# Patient Record
Sex: MALE | Race: WHITE | NOT HISPANIC OR LATINO
[De-identification: names, ages, dates, MRNs, and addresses within clinical notes are randomized per-mention and may not be internally consistent; named-entity substitution may affect disease eponyms.]

---

## 2017-01-02 ENCOUNTER — RESULT REVIEW (OUTPATIENT)
Age: 72
End: 2017-01-02

## 2017-01-03 ENCOUNTER — OUTPATIENT (OUTPATIENT)
Dept: OUTPATIENT SERVICES | Facility: HOSPITAL | Age: 72
LOS: 1 days | End: 2017-01-03
Payer: MEDICARE

## 2017-01-03 LAB
HCT VFR BLD CALC: 37.5 % — LOW (ref 39–50)
HGB BLD-MCNC: 12.3 G/DL — LOW (ref 13–17)
MCHC RBC-ENTMCNC: 28.1 PG — SIGNIFICANT CHANGE UP (ref 27–34)
MCHC RBC-ENTMCNC: 32.8 G/DL — SIGNIFICANT CHANGE UP (ref 32–36)
MCV RBC AUTO: 85.6 FL — SIGNIFICANT CHANGE UP (ref 80–100)
PLATELET # BLD AUTO: 348 K/UL — SIGNIFICANT CHANGE UP (ref 150–400)
RBC # BLD: 4.38 M/UL — SIGNIFICANT CHANGE UP (ref 4.2–5.8)
RBC # FLD: 14.6 % — SIGNIFICANT CHANGE UP (ref 10.3–16.9)
WBC # BLD: 11.1 K/UL — HIGH (ref 3.8–10.5)
WBC # FLD AUTO: 11.1 K/UL — HIGH (ref 3.8–10.5)

## 2017-01-03 PROCEDURE — 88173 CYTOPATH EVAL FNA REPORT: CPT

## 2017-01-03 PROCEDURE — 88341 IMHCHEM/IMCYTCHM EA ADD ANTB: CPT

## 2017-01-03 PROCEDURE — 88305 TISSUE EXAM BY PATHOLOGIST: CPT

## 2017-01-03 PROCEDURE — 76942 ECHO GUIDE FOR BIOPSY: CPT | Mod: 26

## 2017-01-03 PROCEDURE — 88307 TISSUE EXAM BY PATHOLOGIST: CPT

## 2017-01-03 PROCEDURE — 47000 NEEDLE BIOPSY OF LIVER PERQ: CPT

## 2017-01-03 PROCEDURE — 85027 COMPLETE CBC AUTOMATED: CPT

## 2017-01-03 PROCEDURE — 88342 IMHCHEM/IMCYTCHM 1ST ANTB: CPT

## 2017-01-05 LAB
NON-GYNECOLOGICAL CYTOLOGY STUDY: SIGNIFICANT CHANGE UP
SURGICAL PATHOLOGY STUDY: SIGNIFICANT CHANGE UP

## 2017-01-10 ENCOUNTER — OUTPATIENT (OUTPATIENT)
Dept: OUTPATIENT SERVICES | Facility: HOSPITAL | Age: 72
LOS: 1 days | End: 2017-01-10
Payer: MEDICARE

## 2017-01-10 PROCEDURE — 36561 INSERT TUNNELED CV CATH: CPT

## 2017-01-10 PROCEDURE — 77001 FLUOROGUIDE FOR VEIN DEVICE: CPT

## 2017-01-10 PROCEDURE — C1769: CPT

## 2017-01-10 PROCEDURE — C1788: CPT

## 2017-01-10 PROCEDURE — 76937 US GUIDE VASCULAR ACCESS: CPT

## 2017-02-04 ENCOUNTER — OUTPATIENT (OUTPATIENT)
Dept: OUTPATIENT SERVICES | Facility: HOSPITAL | Age: 72
LOS: 1 days | End: 2017-02-04
Payer: MEDICARE

## 2017-02-04 PROCEDURE — 74178 CT ABD&PLV WO CNTR FLWD CNTR: CPT

## 2017-02-04 PROCEDURE — 74178 CT ABD&PLV WO CNTR FLWD CNTR: CPT | Mod: 26

## 2017-02-14 ENCOUNTER — OUTPATIENT (OUTPATIENT)
Dept: OUTPATIENT SERVICES | Facility: HOSPITAL | Age: 72
LOS: 1 days | End: 2017-02-14
Payer: MEDICARE

## 2017-02-14 LAB
ALBUMIN SERPL ELPH-MCNC: 2.9 G/DL — LOW (ref 3.4–5)
ALP SERPL-CCNC: 91 U/L — SIGNIFICANT CHANGE UP (ref 40–120)
ALT FLD-CCNC: 27 U/L — SIGNIFICANT CHANGE UP (ref 12–42)
ANION GAP SERPL CALC-SCNC: 9 MMOL/L — SIGNIFICANT CHANGE UP (ref 9–16)
AST SERPL-CCNC: 24 U/L — SIGNIFICANT CHANGE UP (ref 15–37)
BILIRUB SERPL-MCNC: 0.6 MG/DL — SIGNIFICANT CHANGE UP (ref 0.2–1.2)
BUN SERPL-MCNC: 21 MG/DL — SIGNIFICANT CHANGE UP (ref 7–23)
CALCIUM SERPL-MCNC: 9.5 MG/DL — SIGNIFICANT CHANGE UP (ref 8.5–10.5)
CHLORIDE SERPL-SCNC: 104 MMOL/L — SIGNIFICANT CHANGE UP (ref 96–108)
CO2 SERPL-SCNC: 25 MMOL/L — SIGNIFICANT CHANGE UP (ref 22–31)
CREAT SERPL-MCNC: 0.71 MG/DL — SIGNIFICANT CHANGE UP (ref 0.5–1.3)
GLUCOSE SERPL-MCNC: 101 MG/DL — HIGH (ref 70–99)
POTASSIUM SERPL-MCNC: 4.1 MMOL/L — SIGNIFICANT CHANGE UP (ref 3.5–5.3)
POTASSIUM SERPL-SCNC: 4.1 MMOL/L — SIGNIFICANT CHANGE UP (ref 3.5–5.3)
PROT SERPL-MCNC: 7.3 G/DL — SIGNIFICANT CHANGE UP (ref 6.4–8.2)
SODIUM SERPL-SCNC: 138 MMOL/L — SIGNIFICANT CHANGE UP (ref 135–145)

## 2017-02-14 PROCEDURE — 36248 INS CATH ABD/L-EXT ART ADDL: CPT

## 2017-02-14 PROCEDURE — 75726 ARTERY X-RAYS ABDOMEN: CPT

## 2017-02-14 PROCEDURE — C1887: CPT

## 2017-02-14 PROCEDURE — 36247 INS CATH ABD/L-EXT ART 3RD: CPT

## 2017-02-14 PROCEDURE — 77263 THER RADIOLOGY TX PLNG CPLX: CPT

## 2017-02-14 PROCEDURE — 36246 INS CATH ABD/L-EXT ART 2ND: CPT | Mod: XS

## 2017-02-14 PROCEDURE — 75774 ARTERY X-RAY EACH VESSEL: CPT | Mod: 26,59

## 2017-02-14 PROCEDURE — 76377 3D RENDER W/INTRP POSTPROCES: CPT | Mod: 26

## 2017-02-14 PROCEDURE — C1769: CPT

## 2017-02-14 PROCEDURE — C1894: CPT

## 2017-02-14 PROCEDURE — 75774 ARTERY X-RAY EACH VESSEL: CPT | Mod: 59

## 2017-02-14 PROCEDURE — 78205: CPT | Mod: 26

## 2017-02-14 PROCEDURE — 77300 RADIATION THERAPY DOSE PLAN: CPT

## 2017-02-14 PROCEDURE — 77300 RADIATION THERAPY DOSE PLAN: CPT | Mod: 26

## 2017-02-14 PROCEDURE — 75726 ARTERY X-RAYS ABDOMEN: CPT | Mod: 26,59

## 2017-02-14 PROCEDURE — A9540: CPT

## 2017-02-14 PROCEDURE — 80053 COMPREHEN METABOLIC PANEL: CPT

## 2017-02-14 PROCEDURE — 78803 RP LOCLZJ TUM SPECT 1 AREA: CPT

## 2017-02-23 ENCOUNTER — OUTPATIENT (OUTPATIENT)
Dept: OUTPATIENT SERVICES | Facility: HOSPITAL | Age: 72
LOS: 1 days | End: 2017-02-23
Payer: MEDICARE

## 2017-02-23 PROCEDURE — 78598 LUNG PERF&VENTILAT DIFERENTL: CPT

## 2017-02-23 PROCEDURE — C1894: CPT

## 2017-02-23 PROCEDURE — 36246 INS CATH ABD/L-EXT ART 2ND: CPT

## 2017-02-23 PROCEDURE — 78803 RP LOCLZJ TUM SPECT 1 AREA: CPT

## 2017-02-23 PROCEDURE — C1887: CPT

## 2017-02-23 PROCEDURE — 78205: CPT | Mod: 26

## 2017-02-23 PROCEDURE — 37243 VASC EMBOLIZE/OCCLUDE ORGAN: CPT

## 2017-02-23 PROCEDURE — C1769: CPT

## 2017-02-23 PROCEDURE — 79445 NUCLEAR RX INTRA-ARTERIAL: CPT | Mod: 26

## 2017-02-23 PROCEDURE — C2616: CPT

## 2017-03-16 ENCOUNTER — OUTPATIENT (OUTPATIENT)
Dept: OUTPATIENT SERVICES | Facility: HOSPITAL | Age: 72
LOS: 1 days | End: 2017-03-16
Payer: MEDICARE

## 2017-03-16 PROCEDURE — 36247 INS CATH ABD/L-EXT ART 3RD: CPT

## 2017-03-16 PROCEDURE — C1769: CPT

## 2017-03-16 PROCEDURE — C1894: CPT

## 2017-03-16 PROCEDURE — 37243 VASC EMBOLIZE/OCCLUDE ORGAN: CPT

## 2017-03-16 PROCEDURE — C1887: CPT

## 2017-03-16 PROCEDURE — 79445 NUCLEAR RX INTRA-ARTERIAL: CPT | Mod: 26

## 2017-03-16 PROCEDURE — 78803 RP LOCLZJ TUM SPECT 1 AREA: CPT

## 2017-03-16 PROCEDURE — 79445 NUCLEAR RX INTRA-ARTERIAL: CPT

## 2017-03-16 PROCEDURE — 36248 INS CATH ABD/L-EXT ART ADDL: CPT

## 2017-03-16 PROCEDURE — C2616: CPT

## 2017-03-16 PROCEDURE — 78205: CPT | Mod: 26

## 2017-03-16 PROCEDURE — 36246 INS CATH ABD/L-EXT ART 2ND: CPT

## 2017-04-25 ENCOUNTER — OUTPATIENT (OUTPATIENT)
Dept: OUTPATIENT SERVICES | Facility: HOSPITAL | Age: 72
LOS: 1 days | End: 2017-04-25
Payer: MEDICARE

## 2017-04-25 PROCEDURE — 74178 CT ABD&PLV WO CNTR FLWD CNTR: CPT

## 2017-04-25 PROCEDURE — 74178 CT ABD&PLV WO CNTR FLWD CNTR: CPT | Mod: 26

## 2017-05-15 ENCOUNTER — INPATIENT (INPATIENT)
Facility: HOSPITAL | Age: 72
LOS: 3 days | Discharge: HOME | DRG: 871 | End: 2017-05-19
Attending: INTERNAL MEDICINE | Admitting: INTERNAL MEDICINE
Payer: MEDICARE

## 2017-05-15 VITALS
HEART RATE: 102 BPM | SYSTOLIC BLOOD PRESSURE: 123 MMHG | DIASTOLIC BLOOD PRESSURE: 76 MMHG | OXYGEN SATURATION: 96 % | WEIGHT: 229.94 LBS | TEMPERATURE: 103 F | RESPIRATION RATE: 18 BRPM

## 2017-05-15 LAB
ALBUMIN SERPL ELPH-MCNC: 2.4 G/DL — LOW (ref 3.4–5)
ALP SERPL-CCNC: 77 U/L — SIGNIFICANT CHANGE UP (ref 40–120)
ALT FLD-CCNC: 20 U/L — SIGNIFICANT CHANGE UP (ref 12–42)
ANION GAP SERPL CALC-SCNC: 7 MMOL/L — LOW (ref 9–16)
APPEARANCE UR: CLEAR — SIGNIFICANT CHANGE UP
AST SERPL-CCNC: 27 U/L — SIGNIFICANT CHANGE UP (ref 15–37)
BASOPHILS NFR BLD AUTO: 0.9 % — SIGNIFICANT CHANGE UP (ref 0–2)
BILIRUB SERPL-MCNC: 0.9 MG/DL — SIGNIFICANT CHANGE UP (ref 0.2–1.2)
BILIRUB UR-MCNC: NEGATIVE — SIGNIFICANT CHANGE UP
BUN SERPL-MCNC: 10 MG/DL — SIGNIFICANT CHANGE UP (ref 7–23)
CALCIUM SERPL-MCNC: 8.5 MG/DL — SIGNIFICANT CHANGE UP (ref 8.5–10.5)
CHLORIDE SERPL-SCNC: 102 MMOL/L — SIGNIFICANT CHANGE UP (ref 96–108)
CO2 SERPL-SCNC: 28 MMOL/L — SIGNIFICANT CHANGE UP (ref 22–31)
COLOR SPEC: YELLOW — SIGNIFICANT CHANGE UP
CREAT SERPL-MCNC: 0.79 MG/DL — SIGNIFICANT CHANGE UP (ref 0.5–1.3)
DIFF PNL FLD: (no result)
EOSINOPHIL NFR BLD AUTO: 0.3 % — SIGNIFICANT CHANGE UP (ref 0–6)
GLUCOSE SERPL-MCNC: 125 MG/DL — HIGH (ref 70–99)
GLUCOSE UR QL: NEGATIVE — SIGNIFICANT CHANGE UP
HCT VFR BLD CALC: 33.6 % — LOW (ref 39–50)
HGB BLD-MCNC: 10.9 G/DL — LOW (ref 13–17)
KETONES UR-MCNC: NEGATIVE — SIGNIFICANT CHANGE UP
LACTATE SERPL-SCNC: 2.4 MMOL/L — HIGH (ref 0.5–2)
LEUKOCYTE ESTERASE UR-ACNC: NEGATIVE — SIGNIFICANT CHANGE UP
LYMPHOCYTES # BLD AUTO: 25.2 % — SIGNIFICANT CHANGE UP (ref 13–44)
MCHC RBC-ENTMCNC: 27.9 PG — SIGNIFICANT CHANGE UP (ref 27–34)
MCHC RBC-ENTMCNC: 32.4 G/DL — SIGNIFICANT CHANGE UP (ref 32–36)
MCV RBC AUTO: 86.2 FL — SIGNIFICANT CHANGE UP (ref 80–100)
MONOCYTES NFR BLD AUTO: 38.8 % — HIGH (ref 2–14)
NEUTROPHILS NFR BLD AUTO: 34.8 % — LOW (ref 43–77)
NITRITE UR-MCNC: NEGATIVE — SIGNIFICANT CHANGE UP
PH UR: 6.5 — SIGNIFICANT CHANGE UP (ref 5–8)
PLATELET # BLD AUTO: 176 K/UL — SIGNIFICANT CHANGE UP (ref 150–400)
POTASSIUM SERPL-MCNC: 3.8 MMOL/L — SIGNIFICANT CHANGE UP (ref 3.5–5.3)
POTASSIUM SERPL-SCNC: 3.8 MMOL/L — SIGNIFICANT CHANGE UP (ref 3.5–5.3)
PROT SERPL-MCNC: 7.4 G/DL — SIGNIFICANT CHANGE UP (ref 6.4–8.2)
PROT UR-MCNC: NEGATIVE MG/DL — SIGNIFICANT CHANGE UP
RAPID RVP RESULT: DETECTED
RBC # BLD: 3.9 M/UL — LOW (ref 4.2–5.8)
RBC # FLD: 16 % — SIGNIFICANT CHANGE UP (ref 10.3–16.9)
RV+EV RNA SPEC QL NAA+PROBE: DETECTED
SODIUM SERPL-SCNC: 137 MMOL/L — SIGNIFICANT CHANGE UP (ref 135–145)
SP GR SPEC: 1.01 — SIGNIFICANT CHANGE UP (ref 1–1.03)
UROBILINOGEN FLD QL: 0.2 E.U./DL — SIGNIFICANT CHANGE UP
WBC # BLD: 3.2 K/UL — LOW (ref 3.8–10.5)
WBC # FLD AUTO: 3.2 K/UL — LOW (ref 3.8–10.5)

## 2017-05-15 PROCEDURE — 93010 ELECTROCARDIOGRAM REPORT: CPT

## 2017-05-15 PROCEDURE — 99285 EMERGENCY DEPT VISIT HI MDM: CPT | Mod: 25

## 2017-05-15 PROCEDURE — 71010: CPT | Mod: 26

## 2017-05-15 RX ORDER — ACETAMINOPHEN 500 MG
650 TABLET ORAL ONCE
Qty: 0 | Refills: 0 | Status: COMPLETED | OUTPATIENT
Start: 2017-05-15 | End: 2017-05-15

## 2017-05-15 RX ORDER — ACETAMINOPHEN 500 MG
650 TABLET ORAL EVERY 6 HOURS
Qty: 0 | Refills: 0 | Status: DISCONTINUED | OUTPATIENT
Start: 2017-05-15 | End: 2017-05-19

## 2017-05-15 RX ORDER — SODIUM CHLORIDE 9 MG/ML
500 INJECTION INTRAMUSCULAR; INTRAVENOUS; SUBCUTANEOUS
Qty: 0 | Refills: 0 | Status: DISCONTINUED | OUTPATIENT
Start: 2017-05-15 | End: 2017-05-16

## 2017-05-15 RX ORDER — SENNA PLUS 8.6 MG/1
2 TABLET ORAL AT BEDTIME
Qty: 0 | Refills: 0 | Status: DISCONTINUED | OUTPATIENT
Start: 2017-05-15 | End: 2017-05-19

## 2017-05-15 RX ORDER — SODIUM CHLORIDE 9 MG/ML
500 INJECTION INTRAMUSCULAR; INTRAVENOUS; SUBCUTANEOUS ONCE
Qty: 0 | Refills: 0 | Status: COMPLETED | OUTPATIENT
Start: 2017-05-15 | End: 2017-05-15

## 2017-05-15 RX ORDER — HEPARIN SODIUM 5000 [USP'U]/ML
5000 INJECTION INTRAVENOUS; SUBCUTANEOUS EVERY 8 HOURS
Qty: 0 | Refills: 0 | Status: DISCONTINUED | OUTPATIENT
Start: 2017-05-15 | End: 2017-05-19

## 2017-05-15 RX ORDER — VANCOMYCIN HCL 1 G
1000 VIAL (EA) INTRAVENOUS ONCE
Qty: 0 | Refills: 0 | Status: COMPLETED | OUTPATIENT
Start: 2017-05-15 | End: 2017-05-15

## 2017-05-15 RX ORDER — SODIUM CHLORIDE 9 MG/ML
1000 INJECTION INTRAMUSCULAR; INTRAVENOUS; SUBCUTANEOUS
Qty: 0 | Refills: 0 | Status: DISCONTINUED | OUTPATIENT
Start: 2017-05-15 | End: 2017-05-17

## 2017-05-15 RX ORDER — VANCOMYCIN HCL 1 G
1000 VIAL (EA) INTRAVENOUS EVERY 12 HOURS
Qty: 0 | Refills: 0 | Status: DISCONTINUED | OUTPATIENT
Start: 2017-05-16 | End: 2017-05-16

## 2017-05-15 RX ORDER — PIPERACILLIN AND TAZOBACTAM 4; .5 G/20ML; G/20ML
3.38 INJECTION, POWDER, LYOPHILIZED, FOR SOLUTION INTRAVENOUS EVERY 6 HOURS
Qty: 0 | Refills: 0 | Status: COMPLETED | OUTPATIENT
Start: 2017-05-15 | End: 2017-05-16

## 2017-05-15 RX ORDER — FILGRASTIM 480MCG/1.6
480 VIAL (ML) INJECTION ONCE
Qty: 0 | Refills: 0 | Status: COMPLETED | OUTPATIENT
Start: 2017-05-15 | End: 2017-05-15

## 2017-05-15 RX ORDER — AMLODIPINE BESYLATE 2.5 MG/1
10 TABLET ORAL DAILY
Qty: 0 | Refills: 0 | Status: DISCONTINUED | OUTPATIENT
Start: 2017-05-16 | End: 2017-05-17

## 2017-05-15 RX ORDER — SODIUM CHLORIDE 9 MG/ML
3 INJECTION INTRAMUSCULAR; INTRAVENOUS; SUBCUTANEOUS ONCE
Qty: 0 | Refills: 0 | Status: COMPLETED | OUTPATIENT
Start: 2017-05-15 | End: 2017-05-15

## 2017-05-15 RX ORDER — DOCUSATE SODIUM 100 MG
100 CAPSULE ORAL THREE TIMES A DAY
Qty: 0 | Refills: 0 | Status: DISCONTINUED | OUTPATIENT
Start: 2017-05-15 | End: 2017-05-19

## 2017-05-15 RX ORDER — PIPERACILLIN AND TAZOBACTAM 4; .5 G/20ML; G/20ML
3.38 INJECTION, POWDER, LYOPHILIZED, FOR SOLUTION INTRAVENOUS ONCE
Qty: 0 | Refills: 0 | Status: COMPLETED | OUTPATIENT
Start: 2017-05-15 | End: 2017-05-15

## 2017-05-15 RX ADMIN — SODIUM CHLORIDE 2000 MILLILITER(S): 9 INJECTION INTRAMUSCULAR; INTRAVENOUS; SUBCUTANEOUS at 19:57

## 2017-05-15 RX ADMIN — SODIUM CHLORIDE 2000 MILLILITER(S): 9 INJECTION INTRAMUSCULAR; INTRAVENOUS; SUBCUTANEOUS at 19:58

## 2017-05-15 RX ADMIN — SODIUM CHLORIDE 125 MILLILITER(S): 9 INJECTION INTRAMUSCULAR; INTRAVENOUS; SUBCUTANEOUS at 21:28

## 2017-05-15 RX ADMIN — PIPERACILLIN AND TAZOBACTAM 200 GRAM(S): 4; .5 INJECTION, POWDER, LYOPHILIZED, FOR SOLUTION INTRAVENOUS at 19:47

## 2017-05-15 RX ADMIN — Medication 480 MICROGRAM(S): at 21:15

## 2017-05-15 RX ADMIN — SENNA PLUS 2 TABLET(S): 8.6 TABLET ORAL at 23:50

## 2017-05-15 RX ADMIN — Medication 100 MILLIGRAM(S): at 23:58

## 2017-05-15 RX ADMIN — SODIUM CHLORIDE 3 MILLILITER(S): 9 INJECTION INTRAMUSCULAR; INTRAVENOUS; SUBCUTANEOUS at 19:11

## 2017-05-15 RX ADMIN — SODIUM CHLORIDE 1500 MILLILITER(S): 9 INJECTION INTRAMUSCULAR; INTRAVENOUS; SUBCUTANEOUS at 21:27

## 2017-05-15 RX ADMIN — Medication 250 MILLIGRAM(S): at 21:16

## 2017-05-15 RX ADMIN — Medication 650 MILLIGRAM(S): at 21:15

## 2017-05-15 NOTE — ED PROVIDER NOTE - OBJECTIVE STATEMENT
Pt with PMHx HTN, colon CA w/ mets to the liver on chemo p/w fever x 3-4 days. Pt reports cough w/ clear-white sputum. + mild rhinorrhea. No CP, SOB, abdominal pain, n/v/d, dysuria, or rash. Pt was started on Levaquin, of which today he took the 4th dose, w/o relief. No recent travel or sick contacts. No recent hospitalizations

## 2017-05-15 NOTE — ED ADULT NURSE REASSESSMENT NOTE - NS ED NURSE REASSESS COMMENT FT1
Right port catherter was accessed  with sterile equipment, manager Sommer HUBER at bedside. PT. resting and dressing is CDI

## 2017-05-15 NOTE — ED ADULT NURSE NOTE - OBJECTIVE STATEMENT
Pt received aox3, complaint of fever for 3 days and feeling weak. Pt is being treated with chemotherapy and internal radiation for stage 4 liver cancer. Pt denies CP, states he has cough for 3 days with clear sputum. Pt has chest port for chemotherapy. Wife at bedside. Awaiting provider.

## 2017-05-15 NOTE — ED PROVIDER NOTE - DIAGNOSTIC INTERPRETATION
ER Physician: Palmira Mercado, DO  CHEST XRAY INTERPRETATION: lungs clear, heart shadow normal, bony structures intact

## 2017-05-15 NOTE — ED CLERICAL - NS ED CLERK NOTE PRE-ARRIVAL INFORMATION; ADDITIONAL PRE-ARRIVAL INFORMATION
eileen stack 72 Male colon cancer mets to liver on chemo fevers chills on levaquin x 3 days sob ? pneuomonia ? port infection?

## 2017-05-15 NOTE — ED PROVIDER NOTE - MEDICAL DECISION MAKING DETAILS
Pt p/w fever for several days. Mild cough. Clear lungs. No clear source. Pt high-risk for SBI 2/2 chemo /neutropenia. Will start abx, cx, d/w Dr Steward. Anticipate admission.

## 2017-05-15 NOTE — ED ADULT TRIAGE NOTE - CHIEF COMPLAINT QUOTE
patient BIBA on treatment for stage IV liver cancer. patient complains of cough and fever for the past 4 days. on Levoquin PO with no improvement. denies chest pain, sob. states that his doctor sent him in for further eval.

## 2017-05-15 NOTE — ED ADULT NURSE REASSESSMENT NOTE - NS ED NURSE REASSESS COMMENT FT1
pt has no complaints at this time. resting in stretcher. vs stable. ghada jung given report. awaiting transportation

## 2017-05-15 NOTE — ED PROVIDER NOTE - PROGRESS NOTE DETAILS
D/w Dr Steward - admit to her partner, Dr Hightower. Broad spectum abx initiated given on chemo, mild neutropenia. Requesting Neupogen 480 mcg x 1 now. She will have ID Dr Ramirez see the pt

## 2017-05-16 DIAGNOSIS — D64.9 ANEMIA, UNSPECIFIED: ICD-10-CM

## 2017-05-16 DIAGNOSIS — K59.00 CONSTIPATION, UNSPECIFIED: ICD-10-CM

## 2017-05-16 DIAGNOSIS — A41.9 SEPSIS, UNSPECIFIED ORGANISM: ICD-10-CM

## 2017-05-16 DIAGNOSIS — C18.9 MALIGNANT NEOPLASM OF COLON, UNSPECIFIED: ICD-10-CM

## 2017-05-16 DIAGNOSIS — I10 ESSENTIAL (PRIMARY) HYPERTENSION: ICD-10-CM

## 2017-05-16 LAB
ANION GAP SERPL CALC-SCNC: 9 MMOL/L — SIGNIFICANT CHANGE UP (ref 9–16)
APTT BLD: 33.4 SEC — SIGNIFICANT CHANGE UP (ref 27.5–37.4)
BASOPHILS NFR BLD AUTO: 0.5 % — SIGNIFICANT CHANGE UP (ref 0–2)
BASOPHILS NFR BLD AUTO: 2 % — SIGNIFICANT CHANGE UP (ref 0–2)
BLD GP AB SCN SERPL QL: NEGATIVE — SIGNIFICANT CHANGE UP
BUN SERPL-MCNC: 6 MG/DL — LOW (ref 7–23)
CALCIUM SERPL-MCNC: 8.8 MG/DL — SIGNIFICANT CHANGE UP (ref 8.5–10.5)
CHLORIDE SERPL-SCNC: 106 MMOL/L — SIGNIFICANT CHANGE UP (ref 96–108)
CO2 SERPL-SCNC: 24 MMOL/L — SIGNIFICANT CHANGE UP (ref 22–31)
CREAT SERPL-MCNC: 0.6 MG/DL — SIGNIFICANT CHANGE UP (ref 0.5–1.3)
EOSINOPHIL NFR BLD AUTO: 0 % — SIGNIFICANT CHANGE UP (ref 0–6)
FERRITIN SERPL-MCNC: 933.4 NG/ML — HIGH (ref 26–388)
FOLATE SERPL-MCNC: >20 NG/ML — SIGNIFICANT CHANGE UP (ref 4.8–24.2)
GLUCOSE SERPL-MCNC: 136 MG/DL — HIGH (ref 70–99)
HCT VFR BLD CALC: 32.8 % — LOW (ref 39–50)
HGB BLD-MCNC: 10.7 G/DL — LOW (ref 13–17)
INR BLD: 1.38 — HIGH (ref 0.88–1.16)
IRON SATN MFR SERPL: 12 UG/DL — LOW (ref 65–175)
IRON SATN MFR SERPL: 5 % — LOW (ref 26–39)
LACTATE SERPL-SCNC: 2.4 MMOL/L — HIGH (ref 0.5–2)
LACTATE SERPL-SCNC: 2.7 MMOL/L — HIGH (ref 0.5–2)
LACTATE SERPL-SCNC: 2.8 MMOL/L — HIGH (ref 0.5–2)
LACTATE SERPL-SCNC: 3.2 MMOL/L — HIGH (ref 0.5–2)
LACTATE SERPL-SCNC: 3.5 MMOL/L — HIGH (ref 0.5–2)
LG PLATELETS BLD QL AUTO: PRESENT — SIGNIFICANT CHANGE UP
LYMPHOCYTES # BLD AUTO: 12 % — LOW (ref 13–44)
LYMPHOCYTES # BLD AUTO: 15.6 % — SIGNIFICANT CHANGE UP (ref 13–44)
MAGNESIUM SERPL-MCNC: 1.5 MG/DL — LOW (ref 1.6–2.6)
MANUAL SMEAR VERIFICATION: SIGNIFICANT CHANGE UP
MCHC RBC-ENTMCNC: 27.9 PG — SIGNIFICANT CHANGE UP (ref 27–34)
MCHC RBC-ENTMCNC: 32.6 G/DL — SIGNIFICANT CHANGE UP (ref 32–36)
MCV RBC AUTO: 85.4 FL — SIGNIFICANT CHANGE UP (ref 80–100)
MONOCYTES NFR BLD AUTO: 26 % — HIGH (ref 2–14)
MONOCYTES NFR BLD AUTO: 32.3 % — HIGH (ref 2–14)
NEUTROPHILS NFR BLD AUTO: 47 % — SIGNIFICANT CHANGE UP (ref 43–77)
NEUTROPHILS NFR BLD AUTO: 51.6 % — SIGNIFICANT CHANGE UP (ref 43–77)
NEUTS BAND # BLD: 10 % — SIGNIFICANT CHANGE UP
PHOSPHATE SERPL-MCNC: 2.1 MG/DL — LOW (ref 2.5–4.5)
PLAT MORPH BLD: (no result)
PLATELET # BLD AUTO: 146 K/UL — LOW (ref 150–400)
POLYCHROMASIA BLD QL SMEAR: SLIGHT — SIGNIFICANT CHANGE UP
POTASSIUM SERPL-MCNC: 3.4 MMOL/L — LOW (ref 3.5–5.3)
POTASSIUM SERPL-SCNC: 3.4 MMOL/L — LOW (ref 3.5–5.3)
PROTHROM AB SERPL-ACNC: 15.4 SEC — HIGH (ref 9.8–12.7)
RBC # BLD: 3.84 M/UL — LOW (ref 4.2–5.8)
RBC # FLD: 16.5 % — SIGNIFICANT CHANGE UP (ref 10.3–16.9)
RBC BLD AUTO: (no result)
RH IG SCN BLD-IMP: POSITIVE — SIGNIFICANT CHANGE UP
SODIUM SERPL-SCNC: 139 MMOL/L — SIGNIFICANT CHANGE UP (ref 135–145)
TIBC SERPL-MCNC: 257 UG/DL — SIGNIFICANT CHANGE UP (ref 250–450)
TSH SERPL-MCNC: 0.73 UIU/ML — SIGNIFICANT CHANGE UP (ref 0.35–4.94)
VARIANT LYMPHS # BLD: 3 % — SIGNIFICANT CHANGE UP
VIT B12 SERPL-MCNC: 793 PG/ML — SIGNIFICANT CHANGE UP (ref 243–894)
WBC # BLD: 4 K/UL — SIGNIFICANT CHANGE UP (ref 3.8–10.5)
WBC # FLD AUTO: 4 K/UL — SIGNIFICANT CHANGE UP (ref 3.8–10.5)

## 2017-05-16 PROCEDURE — 93010 ELECTROCARDIOGRAM REPORT: CPT

## 2017-05-16 PROCEDURE — 71010: CPT | Mod: 26

## 2017-05-16 RX ORDER — PIPERACILLIN AND TAZOBACTAM 4; .5 G/20ML; G/20ML
3.38 INJECTION, POWDER, LYOPHILIZED, FOR SOLUTION INTRAVENOUS EVERY 6 HOURS
Qty: 0 | Refills: 0 | Status: DISCONTINUED | OUTPATIENT
Start: 2017-05-16 | End: 2017-05-16

## 2017-05-16 RX ORDER — SODIUM CHLORIDE 9 MG/ML
1000 INJECTION INTRAMUSCULAR; INTRAVENOUS; SUBCUTANEOUS ONCE
Qty: 0 | Refills: 0 | Status: COMPLETED | OUTPATIENT
Start: 2017-05-16 | End: 2017-05-16

## 2017-05-16 RX ORDER — MAGNESIUM SULFATE 500 MG/ML
4 VIAL (ML) INJECTION ONCE
Qty: 0 | Refills: 0 | Status: COMPLETED | OUTPATIENT
Start: 2017-05-16 | End: 2017-05-16

## 2017-05-16 RX ORDER — POTASSIUM CHLORIDE 20 MEQ
40 PACKET (EA) ORAL ONCE
Qty: 0 | Refills: 0 | Status: COMPLETED | OUTPATIENT
Start: 2017-05-16 | End: 2017-05-16

## 2017-05-16 RX ORDER — SODIUM CHLORIDE 9 MG/ML
500 INJECTION INTRAMUSCULAR; INTRAVENOUS; SUBCUTANEOUS ONCE
Qty: 0 | Refills: 0 | Status: COMPLETED | OUTPATIENT
Start: 2017-05-16 | End: 2017-05-16

## 2017-05-16 RX ORDER — PIPERACILLIN AND TAZOBACTAM 4; .5 G/20ML; G/20ML
4.5 INJECTION, POWDER, LYOPHILIZED, FOR SOLUTION INTRAVENOUS EVERY 6 HOURS
Qty: 0 | Refills: 0 | Status: DISCONTINUED | OUTPATIENT
Start: 2017-05-16 | End: 2017-05-19

## 2017-05-16 RX ORDER — VANCOMYCIN HCL 1 G
1500 VIAL (EA) INTRAVENOUS EVERY 12 HOURS
Qty: 0 | Refills: 0 | Status: DISCONTINUED | OUTPATIENT
Start: 2017-05-17 | End: 2017-05-17

## 2017-05-16 RX ORDER — POTASSIUM PHOSPHATE, MONOBASIC POTASSIUM PHOSPHATE, DIBASIC 236; 224 MG/ML; MG/ML
15 INJECTION, SOLUTION INTRAVENOUS ONCE
Qty: 0 | Refills: 0 | Status: COMPLETED | OUTPATIENT
Start: 2017-05-16 | End: 2017-05-16

## 2017-05-16 RX ORDER — VANCOMYCIN HCL 1 G
VIAL (EA) INTRAVENOUS
Qty: 0 | Refills: 0 | Status: DISCONTINUED | OUTPATIENT
Start: 2017-05-16 | End: 2017-05-17

## 2017-05-16 RX ORDER — VANCOMYCIN HCL 1 G
1500 VIAL (EA) INTRAVENOUS ONCE
Qty: 0 | Refills: 0 | Status: COMPLETED | OUTPATIENT
Start: 2017-05-16 | End: 2017-05-16

## 2017-05-16 RX ORDER — VANCOMYCIN HCL 1 G
1500 VIAL (EA) INTRAVENOUS ONCE
Qty: 0 | Refills: 0 | Status: DISCONTINUED | OUTPATIENT
Start: 2017-05-16 | End: 2017-05-16

## 2017-05-16 RX ORDER — SODIUM CHLORIDE 9 MG/ML
1000 INJECTION INTRAMUSCULAR; INTRAVENOUS; SUBCUTANEOUS
Qty: 0 | Refills: 0 | Status: COMPLETED | OUTPATIENT
Start: 2017-05-16 | End: 2017-05-16

## 2017-05-16 RX ORDER — VANCOMYCIN HCL 1 G
1500 VIAL (EA) INTRAVENOUS EVERY 12 HOURS
Qty: 0 | Refills: 0 | Status: DISCONTINUED | OUTPATIENT
Start: 2017-05-16 | End: 2017-05-16

## 2017-05-16 RX ADMIN — Medication 100 MILLIGRAM(S): at 22:05

## 2017-05-16 RX ADMIN — PIPERACILLIN AND TAZOBACTAM 200 GRAM(S): 4; .5 INJECTION, POWDER, LYOPHILIZED, FOR SOLUTION INTRAVENOUS at 07:38

## 2017-05-16 RX ADMIN — SODIUM CHLORIDE 1000 MILLILITER(S): 9 INJECTION INTRAMUSCULAR; INTRAVENOUS; SUBCUTANEOUS at 12:33

## 2017-05-16 RX ADMIN — Medication 40 MILLIEQUIVALENT(S): at 12:33

## 2017-05-16 RX ADMIN — PIPERACILLIN AND TAZOBACTAM 200 GRAM(S): 4; .5 INJECTION, POWDER, LYOPHILIZED, FOR SOLUTION INTRAVENOUS at 15:10

## 2017-05-16 RX ADMIN — Medication 300 MILLIGRAM(S): at 22:02

## 2017-05-16 RX ADMIN — SODIUM CHLORIDE 4000 MILLILITER(S): 9 INJECTION INTRAMUSCULAR; INTRAVENOUS; SUBCUTANEOUS at 01:21

## 2017-05-16 RX ADMIN — PIPERACILLIN AND TAZOBACTAM 200 GRAM(S): 4; .5 INJECTION, POWDER, LYOPHILIZED, FOR SOLUTION INTRAVENOUS at 20:06

## 2017-05-16 RX ADMIN — Medication 100 GRAM(S): at 12:32

## 2017-05-16 RX ADMIN — Medication 100 MILLIGRAM(S): at 06:20

## 2017-05-16 RX ADMIN — HEPARIN SODIUM 5000 UNIT(S): 5000 INJECTION INTRAVENOUS; SUBCUTANEOUS at 13:06

## 2017-05-16 RX ADMIN — HEPARIN SODIUM 5000 UNIT(S): 5000 INJECTION INTRAVENOUS; SUBCUTANEOUS at 06:19

## 2017-05-16 RX ADMIN — SODIUM CHLORIDE 1000 MILLILITER(S): 9 INJECTION INTRAMUSCULAR; INTRAVENOUS; SUBCUTANEOUS at 20:05

## 2017-05-16 RX ADMIN — HEPARIN SODIUM 5000 UNIT(S): 5000 INJECTION INTRAVENOUS; SUBCUTANEOUS at 22:05

## 2017-05-16 RX ADMIN — Medication 300 MILLIGRAM(S): at 09:48

## 2017-05-16 RX ADMIN — PIPERACILLIN AND TAZOBACTAM 200 GRAM(S): 4; .5 INJECTION, POWDER, LYOPHILIZED, FOR SOLUTION INTRAVENOUS at 02:03

## 2017-05-16 RX ADMIN — SODIUM CHLORIDE 3000 MILLILITER(S): 9 INJECTION INTRAMUSCULAR; INTRAVENOUS; SUBCUTANEOUS at 03:01

## 2017-05-16 RX ADMIN — SODIUM CHLORIDE 1000 MILLILITER(S): 9 INJECTION INTRAMUSCULAR; INTRAVENOUS; SUBCUTANEOUS at 15:18

## 2017-05-16 RX ADMIN — POTASSIUM PHOSPHATE, MONOBASIC POTASSIUM PHOSPHATE, DIBASIC 62.5 MILLIMOLE(S): 236; 224 INJECTION, SOLUTION INTRAVENOUS at 17:50

## 2017-05-16 RX ADMIN — Medication 100 MILLIGRAM(S): at 13:06

## 2017-05-16 RX ADMIN — AMLODIPINE BESYLATE 10 MILLIGRAM(S): 2.5 TABLET ORAL at 06:20

## 2017-05-16 NOTE — H&P ADULT - NSHPLABSRESULTS_GEN_ALL_CORE
.  LABS:                         10.9   3.2   )-----------( 176      ( 15 May 2017 18:27 )             33.6     05-15    137  |  102  |  10  ----------------------------<  125<H>  3.8   |  28  |  0.79    Ca    8.5      15 May 2017 18:27    TPro  7.4  /  Alb  2.4<L>  /  TBili  0.9  /  DBili  x   /  AST  27  /  ALT  20  /  AlkPhos  77  05-15      Urinalysis Basic - ( 15 May 2017 21:39 )    Color: Yellow / Appearance: Clear / S.010 / pH: x  Gluc: x / Ketone: NEGATIVE  / Bili: NEGATIVE / Urobili: 0.2 E.U./dL   Blood: x / Protein: NEGATIVE mg/dL / Nitrite: NEGATIVE   Leuk Esterase: NEGATIVE / RBC: < 5 /HPF / WBC < 5 /HPF   Sq Epi: x / Non Sq Epi: Rare /HPF / Bacteria: Present /HPF            Lactate, Blood: 2.4 mmoL/L (05-15 @ 18:24)      RADIOLOGY, EKG & ADDITIONAL TESTS: Reviewed.     CXR: no overt focal opacification .  LABS:                         10.9   3.2   )-----------( 176      ( 15 May 2017 18:27 )             33.6     05-15    137  |  102  |  10  ----------------------------<  125<H>  3.8   |  28  |  0.79    Ca    8.5      15 May 2017 18:27    TPro  7.4  /  Alb  2.4<L>  /  TBili  0.9  /  DBili  x   /  AST  27  /  ALT  20  /  AlkPhos  77  05-15      Urinalysis Basic - ( 15 May 2017 21:39 )    Color: Yellow / Appearance: Clear / S.010 / pH: x  Gluc: x / Ketone: NEGATIVE  / Bili: NEGATIVE / Urobili: 0.2 E.U./dL   Blood: x / Protein: NEGATIVE mg/dL / Nitrite: NEGATIVE   Leuk Esterase: NEGATIVE / RBC: < 5 /HPF / WBC < 5 /HPF   Sq Epi: x / Non Sq Epi: Rare /HPF / Bacteria: Present /HPF            Lactate, Blood: 2.4 mmoL/L (05-15 @ 18:24)      RADIOLOGY, EKG & ADDITIONAL TESTS: Reviewed.     CXR: no overt focal opacification    ECG: NSR, no acute ischemic STT changes, poor R wave progression

## 2017-05-16 NOTE — H&P ADULT - ASSESSMENT
72 yo M PMH HTN, metastatic colon ca (Dx 12/2016, no surgery, radiation 2 months ago, chemo started 4 month sago and most recent 5/2/17, +mets to liver, has chemoport) presents with fever and cough x 3 days. WBC 3.2. no bands available. ANC even without bands 1114. Lactate 2.4.

## 2017-05-16 NOTE — H&P ADULT - PROBLEM SELECTOR PLAN 5
VÍCTOR. BP acceptable.  - c/w home norvasc 10 qd  - holding coreg 12.5 bid in setting of sepsis, re start as clinically appropriate

## 2017-05-16 NOTE — CONSULT NOTE ADULT - SUBJECTIVE AND OBJECTIVE BOX
HPI:  72 yo M PMH HTN, metastatic colon ca (Dx 2016, no surgery, radiation 2 months ago, chemo started 4 month sago and most recent 17, +mets to liver, has chemoport) presents with fever and cough x 3 days. Pt in usual state of health until 3 days ago when began experiencing malaise/fatigue/cough productive of green sputum/chills. Multiple family members have had similar Sx in recent days. No HA/vision changes/CP/SOB/abd pain/N/V/diarrhea/dysuria/urinary frequency/leg pain or swelling. Pt reports no bowel movement x days and mild bloating.    Now denies any respiratory symptoms  Took Levoflox for 3 days without improvement    Otherwise ROS negative    PAST MEDICAL & SURGICAL HISTORY:  Colon cancer  Liver cancer  Hypertension  No significant past surgical history      MEDICATIONS  (STANDING):  sodium chloride 0.9%. 1000milliLiter(s) IV Continuous <Continuous>  heparin  Injectable 5000Unit(s) SubCutaneous every 8 hours  docusate sodium 100milliGRAM(s) Oral three times a day  amLODIPine   Tablet 10milliGRAM(s) Oral daily  vancomycin  IVPB 1500milliGRAM(s) IV Intermittent every 12 hours  piperacillin/tazobactam IVPB. 3.375Gram(s) IV Intermittent every 6 hours  magnesium sulfate  IVPB 4Gram(s) IV Intermittent once  potassium chloride    Tablet ER 40milliEquivalent(s) Oral once  potassium phosphate IVPB 15milliMole(s) IV Intermittent once    MEDICATIONS  (PRN):  acetaminophen   Tablet 650milliGRAM(s) Oral every 6 hours PRN For Temp greater than 38 C (100.4 F)  acetaminophen   Tablet. 650milliGRAM(s) Oral every 6 hours PRN Mild Pain (1 - 3)  bisacodyl 5milliGRAM(s) Oral daily PRN Constipation  senna 2Tablet(s) Oral at bedtime PRN Constipation      Allergies    No Known Allergies      SOCIAL HISTORY:  Lives at home    FAMILY HISTORY:  Family history of pancreatic cancer (Mother)      Vital Signs Last 24 Hrs  T(C): 37.7, Max: 39.3 (15 @ 17:29)  T(F): 99.8, Max: 102.7 (0515 @ 17:29)  HR: 99 (86 - 102)  BP: 120/75 (109/70 - 133/82)  BP(mean): --  RR: 20 (17 - 20)  SpO2: 96% (95% - 99%)  On RA  Awake and alert  Nontoxic but ill appearing  No rash  No oral lesions  Port site without swelling, erythema or tenderness  RRR  Chest with rhonchi and decreased BSs at right base  Abd protuberant and distended but NT  LE min edema  No Craft        LABS:                        10.7   4.0   )-----------( 146      ( 16 May 2017 06:32 )             32.8         139  |  106  |  6<L>  ----------------------------<  136<H>  3.4<L>   |  24  |  0.60    Ca    8.8      16 May 2017 06:32  Phos  2.1       Mg     1.5         TPro  7.4  /  Alb  2.4<L>  /  TBili  0.9  /  DBili  x   /  AST  27  /  ALT  20  /  AlkPhos  77  0515    PT/INR - ( 16 May 2017 06:32 )   PT: 15.4 sec;   INR: 1.38          PTT - ( 16 May 2017 06:32 )  PTT:33.4 sec  Urinalysis Basic - ( 15 May 2017 21:39 )    Color: Yellow / Appearance: Clear / S.010 / pH: x  Gluc: x / Ketone: NEGATIVE  / Bili: NEGATIVE / Urobili: 0.2 E.U./dL   Blood: x / Protein: NEGATIVE mg/dL / Nitrite: NEGATIVE   Leuk Esterase: NEGATIVE / RBC: < 5 /HPF / WBC < 5 /HPF   Sq Epi: x / Non Sq Epi: Rare /HPF / Bacteria: Present /HPF    Culture - Blood (05.15.17 @ 19:50)    Specimen Source: .Blood Blood-Peripheral    Culture Results:   No growth at 12 hours    Culture - Blood (05.15.17 @ 19:50)    Specimen Source: .Blood Blood-Peripheral    Culture Results:   No growth at 12 hours      RADIOLOGY & ADDITIONAL STUDIES:    EXAM:  XR CHEST 1 VIEW PORT ROUTINE                          PROCEDURE DATE:  2017           INTERPRETATION:  Indication: Fever    A single portable view of the chest is submitted. Comparison is made to   the most recent prior studydated 5/15/2017. No significant interval   change is present. The tip of the left costophrenic angle is not   visualized.     Impression:    No significant interval change      Rapid Respiratory Viral Panel (05.15.17 @ 21:32)    Entero/Rhinovirus (RapRVP): Detected

## 2017-05-16 NOTE — CONSULT NOTE ADULT - ASSESSMENT
Colon CA, on chemo; hx of neutropenia  Patient immunocompromised  Fever  Infection with entero/rhinovirus but unsure whether this is the cause of fever  At risk for pneumonia    RECOMMEND  Continue IV Vanco and Pip-Tazo for now awaiting further cx incubation  In hospital observation  Check Legionella urinary Ag

## 2017-05-16 NOTE — H&P ADULT - PROBLEM SELECTOR PLAN 3
Normocytic. No evidence bleed at present. Likely 2/2 ACD and chemo.  - fu iron studies, TSH, b12, folate

## 2017-05-16 NOTE — H&P ADULT - HISTORY OF PRESENT ILLNESS
72 yo M PMH HTN, metastatic colon ca (Dx 12/2016, no surgery, radiation 2 months ago, chemo started 4 month sago and most recent 5/2/17, +mets to liver, has chemoport) presents with fever and cough x 3 days. Pt in usual state of health until 3 days ago when began experiencing malaise/fatigue/cough productive of green sputum/chills. Multiple family members have had similar Sx in recent days. No HA/vision changes/CP/SOB/abd pain/N/V/diarrhea/dysuria/urinary frequency/leg pain or swelling. Pt reports no bowel movement x days and mild bloating.    In ED:  Vital Signs Last 24 Hrs  T(C): 36.8, Max: 39.3 (05-15 @ 17:29)  T(F): 98.2, Max: 102.7 (05-15 @ 17:29)  HR: 91 (86 - 102)  BP: 133/82 (109/70 - 133/82)  BP(mean): --  RR: 17 (17 - 18)  SpO2: 97% (95% - 99%)    WBC 3.2. no bands available. ANC even without bands 1114. Lactate 2.4. Received vanc/zosyn/500 cc bolus NS.

## 2017-05-16 NOTE — H&P ADULT - PROBLEM SELECTOR PLAN 1
with fever 102.7, WBC 3.2, elevated lactate. RVP+ entero rhinovirus. Hx compelling for viral etiology given multiple family members with similar Sx. Concern for PNA in pt receiving chemo as well, CXR without overt focal infiltrate but possible hazy opacification at RLL. cannot r/o superimposed PNA at this time.  - s/p 1L total IV NS, c/w IV  cc/hr overnight  - fu bl cx  - c/w vanc/zosyn for now to cover MRSA/pseudomonas in pt receiving chemo  - tylenol prn  - trend lactate

## 2017-05-16 NOTE — H&P ADULT - NSHPPHYSICALEXAM_GEN_ALL_CORE
.  VITAL SIGNS:  T(C): 36.8, Max: 39.3 (05-15 @ 17:29)  T(F): 98.2, Max: 102.7 (05-15 @ 17:29)  HR: 91 (86 - 102)  BP: 133/82 (109/70 - 133/82)  BP(mean): --  RR: 17 (17 - 18)  SpO2: 97% (95% - 99%)  Wt(kg): --    PHYSICAL EXAM:    Constitutional: WDWN resting comfortably in bed; NAD, non toxic, unlabored breathing, AOx3, pleasant  Head: NC/AT  Eyes: PERRL, EOMI, anicteric sclera  ENT: no oropharyngeal erythema or exudates; MMM  Neck: supple; no JVD  Respiratory: CTA B/L; no W/R/R, no retractions  Cardiac: +S1/S2; RRR; no M/R/G  Gastrointestinal: soft, NT/ND; no rebound or guarding; +BSx4  Back: no CVAT B/L  Extremities: WWP, no clubbing or cyanosis; no peripheral edema  Musculoskeletal: NROM x4; no joint swelling, tenderness or erythema  Vascular: 2+ DPP BL  Dermatologic: skin warm, dry and intact; no rashes, wounds, or scars  Neurologic: AAOx3; CNII-XII grossly intact; no focal deficits  Psychiatric: affect and characteristics of appearance, verbalizations, behaviors are appropriate

## 2017-05-17 DIAGNOSIS — R63.8 OTHER SYMPTOMS AND SIGNS CONCERNING FOOD AND FLUID INTAKE: ICD-10-CM

## 2017-05-17 DIAGNOSIS — Z29.9 ENCOUNTER FOR PROPHYLACTIC MEASURES, UNSPECIFIED: ICD-10-CM

## 2017-05-17 LAB
ANION GAP SERPL CALC-SCNC: 8 MMOL/L — LOW (ref 9–16)
BUN SERPL-MCNC: 6 MG/DL — LOW (ref 7–23)
CALCIUM SERPL-MCNC: 8.9 MG/DL — SIGNIFICANT CHANGE UP (ref 8.5–10.5)
CHLORIDE SERPL-SCNC: 105 MMOL/L — SIGNIFICANT CHANGE UP (ref 96–108)
CO2 SERPL-SCNC: 25 MMOL/L — SIGNIFICANT CHANGE UP (ref 22–31)
CREAT SERPL-MCNC: 0.64 MG/DL — SIGNIFICANT CHANGE UP (ref 0.5–1.3)
CULTURE RESULTS: NO GROWTH — SIGNIFICANT CHANGE UP
GLUCOSE SERPL-MCNC: 114 MG/DL — HIGH (ref 70–99)
HCT VFR BLD CALC: 32.8 % — LOW (ref 39–50)
HGB BLD-MCNC: 10.7 G/DL — LOW (ref 13–17)
LACTATE SERPL-SCNC: 2.5 MMOL/L — HIGH (ref 0.5–2)
LACTATE SERPL-SCNC: 3 MMOL/L — HIGH (ref 0.5–2)
LACTATE SERPL-SCNC: 3.7 MMOL/L — HIGH (ref 0.5–2)
MAGNESIUM SERPL-MCNC: 1.8 MG/DL — SIGNIFICANT CHANGE UP (ref 1.6–2.6)
MCHC RBC-ENTMCNC: 28.1 PG — SIGNIFICANT CHANGE UP (ref 27–34)
MCHC RBC-ENTMCNC: 32.6 G/DL — SIGNIFICANT CHANGE UP (ref 32–36)
MCV RBC AUTO: 86.1 FL — SIGNIFICANT CHANGE UP (ref 80–100)
PLATELET # BLD AUTO: 188 K/UL — SIGNIFICANT CHANGE UP (ref 150–400)
POTASSIUM SERPL-MCNC: 3.2 MMOL/L — LOW (ref 3.5–5.3)
POTASSIUM SERPL-SCNC: 3.2 MMOL/L — LOW (ref 3.5–5.3)
RBC # BLD: 3.81 M/UL — LOW (ref 4.2–5.8)
RBC # FLD: 16 % — SIGNIFICANT CHANGE UP (ref 10.3–16.9)
SODIUM SERPL-SCNC: 138 MMOL/L — SIGNIFICANT CHANGE UP (ref 135–145)
SPECIMEN SOURCE: SIGNIFICANT CHANGE UP
VANCOMYCIN TROUGH SERPL-MCNC: 9.3 UG/ML — LOW (ref 10–20)
WBC # BLD: 4.4 K/UL — SIGNIFICANT CHANGE UP (ref 3.8–10.5)
WBC # FLD AUTO: 4.4 K/UL — SIGNIFICANT CHANGE UP (ref 3.8–10.5)

## 2017-05-17 RX ORDER — VANCOMYCIN HCL 1 G
1750 VIAL (EA) INTRAVENOUS EVERY 12 HOURS
Qty: 0 | Refills: 0 | Status: COMPLETED | OUTPATIENT
Start: 2017-05-17 | End: 2017-05-18

## 2017-05-17 RX ORDER — MAGNESIUM SULFATE 500 MG/ML
1 VIAL (ML) INJECTION ONCE
Qty: 0 | Refills: 0 | Status: COMPLETED | OUTPATIENT
Start: 2017-05-17 | End: 2017-05-17

## 2017-05-17 RX ORDER — VANCOMYCIN HCL 1 G
1750 VIAL (EA) INTRAVENOUS ONCE
Qty: 0 | Refills: 0 | Status: DISCONTINUED | OUTPATIENT
Start: 2017-05-17 | End: 2017-05-17

## 2017-05-17 RX ORDER — SODIUM CHLORIDE 9 MG/ML
1000 INJECTION INTRAMUSCULAR; INTRAVENOUS; SUBCUTANEOUS
Qty: 0 | Refills: 0 | Status: DISCONTINUED | OUTPATIENT
Start: 2017-05-17 | End: 2017-05-19

## 2017-05-17 RX ORDER — POTASSIUM CHLORIDE 20 MEQ
40 PACKET (EA) ORAL EVERY 4 HOURS
Qty: 0 | Refills: 0 | Status: COMPLETED | OUTPATIENT
Start: 2017-05-17 | End: 2017-05-17

## 2017-05-17 RX ADMIN — SODIUM CHLORIDE 125 MILLILITER(S): 9 INJECTION INTRAMUSCULAR; INTRAVENOUS; SUBCUTANEOUS at 21:53

## 2017-05-17 RX ADMIN — SODIUM CHLORIDE 125 MILLILITER(S): 9 INJECTION INTRAMUSCULAR; INTRAVENOUS; SUBCUTANEOUS at 00:56

## 2017-05-17 RX ADMIN — AMLODIPINE BESYLATE 10 MILLIGRAM(S): 2.5 TABLET ORAL at 05:37

## 2017-05-17 RX ADMIN — Medication 250 MILLIGRAM(S): at 15:01

## 2017-05-17 RX ADMIN — Medication 100 GRAM(S): at 21:27

## 2017-05-17 RX ADMIN — PIPERACILLIN AND TAZOBACTAM 200 GRAM(S): 4; .5 INJECTION, POWDER, LYOPHILIZED, FOR SOLUTION INTRAVENOUS at 07:33

## 2017-05-17 RX ADMIN — PIPERACILLIN AND TAZOBACTAM 200 GRAM(S): 4; .5 INJECTION, POWDER, LYOPHILIZED, FOR SOLUTION INTRAVENOUS at 01:51

## 2017-05-17 RX ADMIN — Medication 100 MILLIGRAM(S): at 14:25

## 2017-05-17 RX ADMIN — HEPARIN SODIUM 5000 UNIT(S): 5000 INJECTION INTRAVENOUS; SUBCUTANEOUS at 14:26

## 2017-05-17 RX ADMIN — PIPERACILLIN AND TAZOBACTAM 200 GRAM(S): 4; .5 INJECTION, POWDER, LYOPHILIZED, FOR SOLUTION INTRAVENOUS at 19:40

## 2017-05-17 RX ADMIN — HEPARIN SODIUM 5000 UNIT(S): 5000 INJECTION INTRAVENOUS; SUBCUTANEOUS at 05:37

## 2017-05-17 RX ADMIN — Medication 100 MILLIGRAM(S): at 05:37

## 2017-05-17 RX ADMIN — Medication 40 MILLIEQUIVALENT(S): at 19:40

## 2017-05-17 RX ADMIN — PIPERACILLIN AND TAZOBACTAM 200 GRAM(S): 4; .5 INJECTION, POWDER, LYOPHILIZED, FOR SOLUTION INTRAVENOUS at 14:26

## 2017-05-17 RX ADMIN — Medication 40 MILLIEQUIVALENT(S): at 14:26

## 2017-05-17 RX ADMIN — Medication 100 MILLIGRAM(S): at 21:24

## 2017-05-17 RX ADMIN — SODIUM CHLORIDE 125 MILLILITER(S): 9 INJECTION INTRAMUSCULAR; INTRAVENOUS; SUBCUTANEOUS at 15:00

## 2017-05-17 RX ADMIN — HEPARIN SODIUM 5000 UNIT(S): 5000 INJECTION INTRAVENOUS; SUBCUTANEOUS at 21:24

## 2017-05-17 NOTE — PROGRESS NOTE ADULT - SUBJECTIVE AND OBJECTIVE BOX
History of Present Illness:  Chief Complaint/Reason for Admission: Fever, cough, recent chemo	  History of Present Illness: 	  70 yo M PMH HTN, metastatic colon ca (Dx 2016, no surgery, radiation 2 months ago, chemo started 4 month sago and most recent 17, +mets to liver, has chemoport) presents with fever and cough x 3 days. Pt in usual state of health until 3 days ago when began experiencing malaise/fatigue/cough productive of green sputum/chills. Multiple family members have had similar Sx in recent days. No HA/vision changes/CP/SOB/abd pain/N/V/diarrhea/dysuria/urinary frequency/leg pain or swelling. Pt reports no bowel movement x days and mild bloating.    In ED:  Vital Signs Last 24 Hrs  T(C): 36.8, Max: 39.3 (-15 @ 17:29)  T(F): 98.2, Max: 102.7 (05-15 @ 17:29)  HR: 91 (86 - 102)  BP: 133/82 (109/70 - 133/82)  BP(mean): --  RR: 17 (17 - 18)  SpO2: 97% (95% - 99%)    WBC 3.2. no bands available. ANC even without bands 1114. Lactate 2.4. Received vanc/zosyn/500 cc bolus NS.    Review of Systems:  Review of Systems: as per HPI	      Allergies and Intolerances:        Allergies:  	No Known Allergies:     Home Medications:   * Incomplete Medication History as of 15-May-2017 18:32 documented in Prescription Writer  · 	amLODIPine:  orally , Last Dose Taken:    · 	carvedilol:  orally , Last Dose Taken:      . .    Patient History:   Past Medical History:  Colon cancer    Hypertension.    Past Surgical History:  No significant past surgical history.    Family History:  Mother  Still living? Unknown  Family history of pancreatic cancer, Age at diagnosis: Age Unknown.    Social History:  Social History (marital status, living situation, occupation, tobacco use, alcohol and drug use, and sexual history): no etoh, illicit drugs. Quit smoking 25 years ago, smoked about 10 pack years.	    Tobacco Screening:  · Core Measure Site	No	    Risk Assessment:   Present on Admission:  Deep Venous Thrombosis	no	  Pulmonary Embolus	no	    Heart Failure:  Does this patient have a history of or has been diagnosed with heart failure? no.    Hepatitis C Screen (per [facility] Department of Health, hepatitis C screening must be offered to every individual born between 1945 and 1965)	Unable to offer due to clinical condition	      Physical Exam:  Physical Exam: . VITAL SIGNS: T(C): 36.8, Max: 39.3 (05-15 @ 17:29) T(F): 98.2, Max: 102.7 (05-15 @ 17:29) HR: 91 (86 - 102) BP: 133/82 (109/70 - 133/82) BP(mean): -- RR: 17 (17 - 18) SpO2: 97% (95% - 99%) Wt(kg): --  PHYSICAL EXAM:  Constitutional: WDWN resting comfortably in bed; NAD, non toxic, unlabored breathing, AOx3, pleasant Head: NC/AT Eyes: PERRL, EOMI, anicteric sclera ENT: no oropharyngeal erythema or exudates; MMM Neck: supple; no JVD Respiratory: CTA B/L; no W/R/R, no retractions Cardiac: +S1/S2; RRR; no M/R/G Gastrointestinal: soft, NT/ND; no rebound or guarding; +BSx4 Back: no CVAT B/L Extremities: WWP, no clubbing or cyanosis; no peripheral edema Musculoskeletal: NROM x4; no joint swelling, tenderness or erythema Vascular: 2+ DPP BL Dermatologic: skin warm, dry and intact; no rashes, wounds, or scars Neurologic: AAOx3; CNII-XII grossly intact; no focal deficits Psychiatric: affect and characteristics of appearance, verbalizations, behaviors are appropriate	      Labs and Results:  Labs, Radiology, Cardiology, and Other Results: . LABS:                       10.9  3.2   )-----------( 176      ( 15 May 2017 18:27 )            33.6   05-15  137  |  102  |  10 ----------------------------<  125<H> 3.8   |  28  |  0.79  Ca    8.5      15 May 2017 18:27  TPro  7.4  /  Alb  2.4<L>  /  TBili  0.9  /  DBili  x   /  AST  27  /  ALT  20  /  AlkPhos  77  05-15  Urinalysis Basic - ( 15 May 2017 21:39 )  Color: Yellow / Appearance: Clear / S.010 / pH: x Gluc: x / Ketone: NEGATIVE  / Bili: NEGATIVE / Urobili: 0.2 E.U./dL  Blood: x / Protein: NEGATIVE mg/dL / Nitrite: NEGATIVE  Leuk Esterase: NEGATIVE / RBC: < 5 /HPF / WBC < 5 /HPF  Sq Epi: x / Non Sq Epi: Rare /HPF / Bacteria: Present /HPF   Lactate, Blood: 2.4 mmoL/L (05-15 @ 18:24)  RADIOLOGY, EKG & ADDITIONAL TESTS: Reviewed.   CXR: no overt focal opacification  ECG: NSR, no acute ischemic STT changes, poor R wave progression	  . LABS:                       10.9  3.2   )-----------( 176      ( 15 May 2017 18:27 )            33.6   05-15  137  |  102  |  10 ----------------------------<  125<H> 3.8   |  28  |  0.79  Ca    8.5      15 May 2017 18:27  TPro  7.4  /  Alb  2.4<L>  /  TBili  0.9  /  DBili  x   /  AST  27  /  ALT  20  /  AlkPhos  77  05-15   Urinalysis Basic - ( 15 May 2017 21:39 )  Color: Yellow / Appearance: Clear / S.010 / pH: x Gluc: x / Ketone: NEGATIVE  / Bili: NEGATIVE / Urobili: 0.2 E.U./dL  Blood: x / Protein: NEGATIVE mg/dL / Nitrite: NEGATIVE  Leuk Esterase: NEGATIVE / RBC: < 5 /HPF / WBC < 5 /HPF  Sq Epi: x / Non Sq Epi: Rare /HPF / Bacteria: Present /HPF      Lactate, Blood: 2.4 mmoL/L (05-15 @ 18:24)   RADIOLOGY, EKG & ADDITIONAL TESTS: Reviewed.   CXR: no overt focal opacification	    Assessment and Plan:   Assessment:  · Assessment		  70 yo M PMH HTN, metastatic colon ca (Dx 2016, no surgery, radiation 2 months ago, chemo started 4 month sago and most recent 17, +mets to liver, has chemoport) presents with fever and cough x 3 days. WBC 3.2. no bands available. ANC even without bands 1114. Lactate 2.4.     Problem/Plan - 1:  ·  Problem: Severe sepsis.  Plan: with fever 102.7, WBC 3.2, elevated lactate. RVP+ entero rhinovirus. Hx compelling for viral etiology given multiple family members with similar Sx. Concern for PNA in pt receiving chemo as well, CXR without overt focal infiltrate but possible hazy opacification at RLL. cannot r/o superimposed PNA at this time.  - s/p 1L total IV NS, c/w IV  cc/hr overnight  - fu bl cx  - c/w vanc/zosyn for now to cover MRSA/pseudomonas in pt receiving chemo  - tylenol prn  - trend lactate.     Problem/Plan - 2:  ·  Problem: Colon cancer.  Plan: VÍCTOR.  - fu Dr. Steward, was planned for chemo Tx tomorrow per family but may postpone.     Problem/Plan - 3:  ·  Problem: Anemia.  Plan: Normocytic. No evidence bleed at present. Likely 2/2 ACD and chemo.  - fu iron studies, TSH, b12, folate.     Problem/Plan - 4:  ·  Problem: Constipation.  Plan: Mild constipation per pt. bloating. No abd pain.  - senna/colace/bisacodyl.     Problem/Plan - 5:  ·  Problem: HTN (hypertension).  Plan: VÍCTOR. BP acceptable.  - c/w home norvasc 10 qd  - holding coreg 12.5 bid in setting of sepsis, re start as clinically appropriate.

## 2017-05-17 NOTE — PROGRESS NOTE ADULT - SUBJECTIVE AND OBJECTIVE BOX
INTERVAL HPI/OVERNIGHT EVENTS:  Reviewed  No complaints  Feels almost at baseline    MEDICATIONS  (STANDING):  heparin  Injectable 5000Unit(s) SubCutaneous every 8 hours  docusate sodium 100milliGRAM(s) Oral three times a day  piperacillin/tazobactam IVPB. 4.5Gram(s) IV Intermittent every 6 hours  magnesium sulfate  IVPB 1Gram(s) IV Intermittent once  vancomycin  IVPB 1750milliGRAM(s) IV Intermittent every 12 hours  sodium chloride 0.9%. 1000milliLiter(s) IV Continuous <Continuous>    MEDICATIONS  (PRN):  acetaminophen   Tablet 650milliGRAM(s) Oral every 6 hours PRN For Temp greater than 38 C (100.4 F)  acetaminophen   Tablet. 650milliGRAM(s) Oral every 6 hours PRN Mild Pain (1 - 3)  senna 2Tablet(s) Oral at bedtime PRN Constipation      Allergies    No Known Allergies    Exam  Vital Signs Last 24 Hrs  T(C): 37.1, Max: 37.6 ( @ 05:05)  T(F): 98.7, Max: 99.7 ( @ 05:05)  HR: 94 (88 - 99)  BP: 118/72 (111/72 - 118/72)  BP(mean): --  RR: 19 (19 - 20)  SpO2: 95% (94% - 99%)  On RA  Awake and alert  No rash  RRR  Chest decreased BSs at bases  Abd softly distended  LE mild edema      LABS:                        10.7   4.4   )-----------( 188      ( 17 May 2017 06:28 )             32.8         138  |  105  |  6<L>  ----------------------------<  114<H>  3.2<L>   |  25  |  0.64    Ca    8.9      17 May 2017 06:28  Phos  2.1     -  Mg     1.8           PT/INR - ( 16 May 2017 06:32 )   PT: 15.4 sec;   INR: 1.38          PTT - ( 16 May 2017 06:32 )  PTT:33.4 sec  Urinalysis Basic - ( 15 May 2017 21:39 )    Color: Yellow / Appearance: Clear / S.010 / pH: x  Gluc: x / Ketone: NEGATIVE  / Bili: NEGATIVE / Urobili: 0.2 E.U./dL   Blood: x / Protein: NEGATIVE mg/dL / Nitrite: NEGATIVE   Leuk Esterase: NEGATIVE / RBC: < 5 /HPF / WBC < 5 /HPF   Sq Epi: x / Non Sq Epi: Rare /HPF / Bacteria: Present /HPF      MICROBIOLOGY:    Culture - Urine (17 @ 07:49)    Specimen Source: .Urine Clean Catch (Midstream)    Culture Results:   No growth    Culture - Blood (17 @ 00:50)    Specimen Source: .Blood Blood    Culture Results:   No growth at 1 day.    Culture - Blood (05.15.17 @ 19:50)    Specimen Source: .Blood Blood-Peripheral    Culture Results:   No growth at 2 days.

## 2017-05-17 NOTE — PROGRESS NOTE ADULT - SUBJECTIVE AND OBJECTIVE BOX
INTERVAL HPI/OVERNIGHT EVENTS:    SUBJECTIVE: Patient seen and examined at bedside.     ROS:  CV: Denies chest pain  Resp: Denies SOB  GI: Denies abdominal pain, constipation, diarrhea, nausea, vomiting  : Denies dysuria  ID: Denies fevers, chills  MSK: Denies joint pain     OBJECTIVE:    VITAL SIGNS:  ICU Vital Signs Last 24 Hrs  T(C): 37.4, Max: 37.9 ( @ 15:56)  T(F): 99.3, Max: 100.2 ( @ 15:56)  HR: 99 (88 - 99)  BP: 111/72 (111/72 - 117/77)  BP(mean): --  ABP: --  ABP(mean): --  RR: 20 (19 - 20)  SpO2: 94% (94% - 99%)        I & Os for current day (as of  @ 12:16)  =============================================  IN: 1450 ml / OUT: 950 ml / NET: 500 ml    CAPILLARY BLOOD GLUCOSE      PHYSICAL EXAM:    General: NAD  HEENT: NC/AT; PERRL, clear conjunctiva  Neck: supple  Respiratory: CTA b/l  Cardiovascular: +S1/S2; RRR  Abdomen: soft, NT/ND; +BS x4  Extremities: WWP, 2+ peripheral pulses b/l; no LE edema  Skin: normal color and turgor; no rash  Neurological:     MEDICATIONS:  MEDICATIONS  (STANDING):  sodium chloride 0.9%. 1000milliLiter(s) IV Continuous <Continuous>  heparin  Injectable 5000Unit(s) SubCutaneous every 8 hours  docusate sodium 100milliGRAM(s) Oral three times a day  piperacillin/tazobactam IVPB. 4.5Gram(s) IV Intermittent every 6 hours  magnesium sulfate  IVPB 1Gram(s) IV Intermittent once  potassium chloride    Tablet ER 40milliEquivalent(s) Oral every 4 hours  vancomycin  IVPB 1750milliGRAM(s) IV Intermittent every 12 hours    MEDICATIONS  (PRN):  acetaminophen   Tablet 650milliGRAM(s) Oral every 6 hours PRN For Temp greater than 38 C (100.4 F)  acetaminophen   Tablet. 650milliGRAM(s) Oral every 6 hours PRN Mild Pain (1 - 3)  bisacodyl 5milliGRAM(s) Oral daily PRN Constipation  senna 2Tablet(s) Oral at bedtime PRN Constipation      ALLERGIES:  Allergies    No Known Allergies    Intolerances        LABS:                        10.7   4.4   )-----------( 188      ( 17 May 2017 06:28 )             32.8     05-17    138  |  105  |  6<L>  ----------------------------<  114<H>  3.2<L>   |  25  |  0.64    Ca    8.9      17 May 2017 06:28  Phos  2.1     05-16  Mg     1.8     -17    TPro  7.4  /  Alb  2.4<L>  /  TBili  0.9  /  DBili  x   /  AST  27  /  ALT  20  /  AlkPhos  77  05-15    PT/INR - ( 16 May 2017 06:32 )   PT: 15.4 sec;   INR: 1.38          PTT - ( 16 May 2017 06:32 )  PTT:33.4 sec  Urinalysis Basic - ( 15 May 2017 21:39 )    Color: Yellow / Appearance: Clear / S.010 / pH: x  Gluc: x / Ketone: NEGATIVE  / Bili: NEGATIVE / Urobili: 0.2 E.U./dL   Blood: x / Protein: NEGATIVE mg/dL / Nitrite: NEGATIVE   Leuk Esterase: NEGATIVE / RBC: < 5 /HPF / WBC < 5 /HPF   Sq Epi: x / Non Sq Epi: Rare /HPF / Bacteria: Present /HPF        RADIOLOGY & ADDITIONAL TESTS: Reviewed. INTERVAL HPI/OVERNIGHT EVENTS:    SUBJECTIVE: Patient seen and examined at bedside.     ROS:  CV: Denies chest pain  Resp: Denies SOB  GI: Denies abdominal pain, constipation, diarrhea, nausea, vomiting  : Denies dysuria  ID: Denies fevers, chills  MSK: Denies joint pain     OBJECTIVE:    VITAL SIGNS:  ICU Vital Signs Last 24 Hrs  T(C): 37.4, Max: 37.9 ( @ 15:56)  T(F): 99.3, Max: 100.2 ( @ 15:56)  HR: 99 (88 - 99)  BP: 111/72 (111/72 - 117/77)  BP(mean): --  ABP: --  ABP(mean): --  RR: 20 (19 - 20)  SpO2: 94% (94% - 99%)        I & Os for current day (as of  @ 12:16)  =============================================  IN: 1450 ml / OUT: 950 ml / NET: 500 ml    CAPILLARY BLOOD GLUCOSE      PHYSICAL EXAM:    Constitutional: WDWN resting comfortably in bed; NAD, non toxic, unlabored breathing, AOx3, pleasant  Head: NC/AT  Eyes: PERRL, EOMI, anicteric sclera  ENT: no oropharyngeal erythema or exudates; MMM  Neck: supple; no JVD  Respiratory: CTA B/L; no W/R/R, no retractions  Cardiac: +S1/S2; RRR; no M/R/G  Gastrointestinal: soft, NT/ND; no rebound or guarding; +BSx4  Back: no CVAT B/L  Extremities: WWP, no clubbing or cyanosis; no peripheral edema  Musculoskeletal: NROM x4; no joint swelling, tenderness or erythema  Vascular: 2+ DPP BL  Dermatologic: skin warm, dry and intact; no rashes, wounds, or scars  Neurologic: AAOx3; CNII-XII grossly intact; no focal deficits  Psychiatric: affect and characteristics of appearance, verbalizations, behaviors are appr    MEDICATIONS:  MEDICATIONS  (STANDING):  sodium chloride 0.9%. 1000milliLiter(s) IV Continuous <Continuous>  heparin  Injectable 5000Unit(s) SubCutaneous every 8 hours  docusate sodium 100milliGRAM(s) Oral three times a day  piperacillin/tazobactam IVPB. 4.5Gram(s) IV Intermittent every 6 hours  magnesium sulfate  IVPB 1Gram(s) IV Intermittent once  potassium chloride    Tablet ER 40milliEquivalent(s) Oral every 4 hours  vancomycin  IVPB 1750milliGRAM(s) IV Intermittent every 12 hours    MEDICATIONS  (PRN):  acetaminophen   Tablet 650milliGRAM(s) Oral every 6 hours PRN For Temp greater than 38 C (100.4 F)  acetaminophen   Tablet. 650milliGRAM(s) Oral every 6 hours PRN Mild Pain (1 - 3)  bisacodyl 5milliGRAM(s) Oral daily PRN Constipation  senna 2Tablet(s) Oral at bedtime PRN Constipation      ALLERGIES:  Allergies    No Known Allergies    Intolerances        LABS:                        10.7   4.4   )-----------( 188      ( 17 May 2017 06:28 )             32.8     05-17    138  |  105  |  6<L>  ----------------------------<  114<H>  3.2<L>   |  25  |  0.64    Ca    8.9      17 May 2017 06:28  Phos  2.1     05-16  Mg     1.8     -17    TPro  7.4  /  Alb  2.4<L>  /  TBili  0.9  /  DBili  x   /  AST  27  /  ALT  20  /  AlkPhos  77  05-15    PT/INR - ( 16 May 2017 06:32 )   PT: 15.4 sec;   INR: 1.38          PTT - ( 16 May 2017 06:32 )  PTT:33.4 sec  Urinalysis Basic - ( 15 May 2017 21:39 )    Color: Yellow / Appearance: Clear / S.010 / pH: x  Gluc: x / Ketone: NEGATIVE  / Bili: NEGATIVE / Urobili: 0.2 E.U./dL   Blood: x / Protein: NEGATIVE mg/dL / Nitrite: NEGATIVE   Leuk Esterase: NEGATIVE / RBC: < 5 /HPF / WBC < 5 /HPF   Sq Epi: x / Non Sq Epi: Rare /HPF / Bacteria: Present /HPF        RADIOLOGY & ADDITIONAL TESTS: Reviewed.

## 2017-05-17 NOTE — PROGRESS NOTE ADULT - PROBLEM SELECTOR PLAN 1
with fever 102.7, WBC 3.2, elevated lactate. RVP+ entero rhinovirus. Hx compelling for viral etiology given multiple family members with similar Sx. Concern for PNA in pt receiving chemo as well, CXR without overt focal infiltrate but possible hazy opacification at RLL. cannot r/o superimposed PNA at this time.  - s/p 1L total IV NS, c/w IV  cc/hr overnight  - fu bl cx  - c/w vanc/zosyn for now to cover MRSA/pseudomonas in pt receiving chemo  - tylenol prn  - trend lactate with fever 102.7, WBC 3.2, elevated lactate. RVP+ entero rhinovirus. Hx compelling for viral etiology given multiple family members with similar Sx. Concern for PNA in pt receiving chemo as well, CXR without overt focal infiltrate but possible hazy opacification at RLL. cannot r/o superimposed PNA at this time.  - c/w IV  cc/hr  -RVP=rhinovirus positive  - blood cultures: NGTD  #Dr. Ramirez following; recs appreciated  - c/w vanc/zosyn for now to cover MRSA/pseudomonas in pt receiving chemo  - tylenol prn  - trend lactate

## 2017-05-17 NOTE — DIETITIAN INITIAL EVALUATION ADULT. - ENERGY NEEDS
BMI:29.9,IBW:190lbs,122% of IBW.Skin intact.No N/V/D.Increase kcal and protein needs used due to fevers and Ca.

## 2017-05-17 NOTE — DIETITIAN INITIAL EVALUATION ADULT. - NS AS NUTRI INTERV MEALS SNACK
Energy - modified diet/Protein - modified diet/Other (specify)/Schedule of food/fluids/General/healthful diet

## 2017-05-18 LAB
ANION GAP SERPL CALC-SCNC: 12 MMOL/L — SIGNIFICANT CHANGE UP (ref 5–17)
BUN SERPL-MCNC: 7 MG/DL — SIGNIFICANT CHANGE UP (ref 7–23)
CALCIUM SERPL-MCNC: 8.7 MG/DL — SIGNIFICANT CHANGE UP (ref 8.4–10.5)
CHLORIDE SERPL-SCNC: 102 MMOL/L — SIGNIFICANT CHANGE UP (ref 96–108)
CO2 SERPL-SCNC: 24 MMOL/L — SIGNIFICANT CHANGE UP (ref 22–31)
CREAT SERPL-MCNC: 0.7 MG/DL — SIGNIFICANT CHANGE UP (ref 0.5–1.3)
GLUCOSE SERPL-MCNC: 125 MG/DL — HIGH (ref 70–99)
HCT VFR BLD CALC: 32.6 % — LOW (ref 39–50)
HGB BLD-MCNC: 10.5 G/DL — LOW (ref 13–17)
MAGNESIUM SERPL-MCNC: 1.8 MG/DL — SIGNIFICANT CHANGE UP (ref 1.6–2.6)
MCHC RBC-ENTMCNC: 27.4 PG — SIGNIFICANT CHANGE UP (ref 27–34)
MCHC RBC-ENTMCNC: 32.2 G/DL — SIGNIFICANT CHANGE UP (ref 32–36)
MCV RBC AUTO: 85.1 FL — SIGNIFICANT CHANGE UP (ref 80–100)
PLATELET # BLD AUTO: 200 K/UL — SIGNIFICANT CHANGE UP (ref 150–400)
POTASSIUM SERPL-MCNC: 3.3 MMOL/L — LOW (ref 3.5–5.3)
POTASSIUM SERPL-SCNC: 3.3 MMOL/L — LOW (ref 3.5–5.3)
RBC # BLD: 3.83 M/UL — LOW (ref 4.2–5.8)
RBC # FLD: 16.3 % — SIGNIFICANT CHANGE UP (ref 10.3–16.9)
SODIUM SERPL-SCNC: 138 MMOL/L — SIGNIFICANT CHANGE UP (ref 135–145)
VANCOMYCIN TROUGH SERPL-MCNC: 10.5 UG/ML — SIGNIFICANT CHANGE UP (ref 10–20)
WBC # BLD: 4.6 K/UL — SIGNIFICANT CHANGE UP (ref 3.8–10.5)
WBC # FLD AUTO: 4.6 K/UL — SIGNIFICANT CHANGE UP (ref 3.8–10.5)

## 2017-05-18 RX ORDER — POTASSIUM CHLORIDE 20 MEQ
40 PACKET (EA) ORAL EVERY 4 HOURS
Qty: 0 | Refills: 0 | Status: COMPLETED | OUTPATIENT
Start: 2017-05-18 | End: 2017-05-18

## 2017-05-18 RX ORDER — IOHEXOL 300 MG/ML
50 INJECTION, SOLUTION INTRAVENOUS ONCE
Qty: 0 | Refills: 0 | Status: COMPLETED | OUTPATIENT
Start: 2017-05-18 | End: 2017-05-19

## 2017-05-18 RX ORDER — VANCOMYCIN HCL 1 G
2000 VIAL (EA) INTRAVENOUS EVERY 12 HOURS
Qty: 0 | Refills: 0 | Status: DISCONTINUED | OUTPATIENT
Start: 2017-05-18 | End: 2017-05-19

## 2017-05-18 RX ORDER — POLYETHYLENE GLYCOL 3350 17 G/17G
17 POWDER, FOR SOLUTION ORAL
Qty: 0 | Refills: 0 | Status: DISCONTINUED | OUTPATIENT
Start: 2017-05-18 | End: 2017-05-19

## 2017-05-18 RX ORDER — MAGNESIUM SULFATE 500 MG/ML
1 VIAL (ML) INJECTION ONCE
Qty: 0 | Refills: 0 | Status: COMPLETED | OUTPATIENT
Start: 2017-05-18 | End: 2017-05-18

## 2017-05-18 RX ADMIN — Medication 100 MILLIGRAM(S): at 22:07

## 2017-05-18 RX ADMIN — Medication 40 MILLIEQUIVALENT(S): at 13:21

## 2017-05-18 RX ADMIN — Medication 250 MILLIGRAM(S): at 00:15

## 2017-05-18 RX ADMIN — Medication 100 MILLIGRAM(S): at 05:33

## 2017-05-18 RX ADMIN — PIPERACILLIN AND TAZOBACTAM 200 GRAM(S): 4; .5 INJECTION, POWDER, LYOPHILIZED, FOR SOLUTION INTRAVENOUS at 07:11

## 2017-05-18 RX ADMIN — HEPARIN SODIUM 5000 UNIT(S): 5000 INJECTION INTRAVENOUS; SUBCUTANEOUS at 22:07

## 2017-05-18 RX ADMIN — HEPARIN SODIUM 5000 UNIT(S): 5000 INJECTION INTRAVENOUS; SUBCUTANEOUS at 05:33

## 2017-05-18 RX ADMIN — Medication 100 MILLIGRAM(S): at 13:21

## 2017-05-18 RX ADMIN — Medication 100 GRAM(S): at 07:25

## 2017-05-18 RX ADMIN — SODIUM CHLORIDE 125 MILLILITER(S): 9 INJECTION INTRAMUSCULAR; INTRAVENOUS; SUBCUTANEOUS at 10:00

## 2017-05-18 RX ADMIN — PIPERACILLIN AND TAZOBACTAM 200 GRAM(S): 4; .5 INJECTION, POWDER, LYOPHILIZED, FOR SOLUTION INTRAVENOUS at 13:21

## 2017-05-18 RX ADMIN — HEPARIN SODIUM 5000 UNIT(S): 5000 INJECTION INTRAVENOUS; SUBCUTANEOUS at 13:21

## 2017-05-18 RX ADMIN — Medication 40 MILLIEQUIVALENT(S): at 09:59

## 2017-05-18 RX ADMIN — POLYETHYLENE GLYCOL 3350 17 GRAM(S): 17 POWDER, FOR SOLUTION ORAL at 18:31

## 2017-05-18 RX ADMIN — PIPERACILLIN AND TAZOBACTAM 200 GRAM(S): 4; .5 INJECTION, POWDER, LYOPHILIZED, FOR SOLUTION INTRAVENOUS at 19:34

## 2017-05-18 RX ADMIN — Medication 250 MILLIGRAM(S): at 10:00

## 2017-05-18 RX ADMIN — PIPERACILLIN AND TAZOBACTAM 200 GRAM(S): 4; .5 INJECTION, POWDER, LYOPHILIZED, FOR SOLUTION INTRAVENOUS at 01:44

## 2017-05-18 NOTE — PROGRESS NOTE ADULT - SUBJECTIVE AND OBJECTIVE BOX
INTERVAL HPI/OVERNIGHT EVENTS:    ANTIBIOTICS/RELEVANT:    MEDICATIONS  (STANDING):  heparin  Injectable 5000Unit(s) SubCutaneous every 8 hours  docusate sodium 100milliGRAM(s) Oral three times a day  piperacillin/tazobactam IVPB. 4.5Gram(s) IV Intermittent every 6 hours  sodium chloride 0.9%. 1000milliLiter(s) IV Continuous <Continuous>  polyethylene glycol 3350 17Gram(s) Oral two times a day  iohexol 350 mG (iodine)/mL Oral Solution 50milliLiter(s) Oral once    MEDICATIONS  (PRN):  acetaminophen   Tablet 650milliGRAM(s) Oral every 6 hours PRN For Temp greater than 38 C (100.4 F)  acetaminophen   Tablet. 650milliGRAM(s) Oral every 6 hours PRN Mild Pain (1 - 3)  senna 2Tablet(s) Oral at bedtime PRN Constipation      Allergies    No Known Allergies    Intolerances        Vital Signs Last 24 Hrs  T(C): 37.4, Max: 38 (05-18 @ 16:55)  T(F): 99.3, Max: 100.4 (05-18 @ 16:55)  HR: 95 (89 - 97)  BP: 137/90 (111/80 - 137/90)  BP(mean): --  RR: 17 (16 - 18)  SpO2: 96% (94% - 97%)    PHYSICAL EXAM:      Constitutional:    Eyes:    ENMT:    Neck:    Breasts:    Back:    Respiratory:    Cardiovascular:    Gastrointestinal:    Genitourinary:    Rectal:    Extremities:    Vascular:    Neurological:    Skin:    Lymph Nodes:    Musculoskeletal:    Psychiatric:        LABS:                        10.5   4.6   )-----------( 200      ( 18 May 2017 06:05 )             32.6     05-18    138  |  102  |  7   ----------------------------<  125<H>  3.3<L>   |  24  |  0.70    Ca    8.7      18 May 2017 06:04  Mg     1.8     05-18            MICROBIOLOGY:    RADIOLOGY & ADDITIONAL STUDIES:

## 2017-05-18 NOTE — PROGRESS NOTE ADULT - SUBJECTIVE AND OBJECTIVE BOX
INTERVAL HPI/OVERNIGHT EVENTS: VÍCTOR o/n    SUBJECTIVE: Patient seen and examined at bedside.     OBJECTIVE:    VITAL SIGNS:  ICU Vital Signs Last 24 Hrs  T(C): 36.8, Max: 37.4 (05-17 @ 11:25)  T(F): 98.3, Max: 99.3 (05-17 @ 11:25)  HR: 89 (89 - 99)  BP: 112/72 (111/72 - 118/72)  BP(mean): --  ABP: --  ABP(mean): --  RR: 18 (18 - 20)  SpO2: 94% (94% - 97%)        I & Os for current day (as of 05-18 @ 07:26)  =============================================  IN: 1125 ml / OUT: 700 ml / NET: 425 ml    CAPILLARY BLOOD GLUCOSE      PHYSICAL EXAM:    Constitutional: WDWN resting comfortably in bed; NAD, non toxic, unlabored breathing, AOx3, pleasant  Head: NC/AT  Eyes: PERRL, EOMI, anicteric sclera  ENT: no oropharyngeal erythema or exudates; MMM  Neck: supple; no JVD  Respiratory: CTA B/L; no W/R/R, no retractions  Cardiac: +S1/S2; RRR; no M/R/G  Gastrointestinal: soft, NT/ND; no rebound or guarding; +BSx4  Back: no CVAT B/L  Extremities: WWP, no clubbing or cyanosis; no peripheral edema  Musculoskeletal: NROM x4; no joint swelling, tenderness or erythema  Vascular: 2+ DPP BL  Dermatologic: skin warm, dry and intact; no rashes, wounds, or scars  Neurologic: AAOx3; CNII-XII grossly intact; no focal deficits  Psychiatric: affect and characteristics of appearance, verbalizations, behaviors are appropriate      MEDICATIONS:  MEDICATIONS  (STANDING):  heparin  Injectable 5000Unit(s) SubCutaneous every 8 hours  docusate sodium 100milliGRAM(s) Oral three times a day  piperacillin/tazobactam IVPB. 4.5Gram(s) IV Intermittent every 6 hours  vancomycin  IVPB 1750milliGRAM(s) IV Intermittent every 12 hours  sodium chloride 0.9%. 1000milliLiter(s) IV Continuous <Continuous>  potassium chloride    Tablet ER 40milliEquivalent(s) Oral every 4 hours    MEDICATIONS  (PRN):  acetaminophen   Tablet 650milliGRAM(s) Oral every 6 hours PRN For Temp greater than 38 C (100.4 F)  acetaminophen   Tablet. 650milliGRAM(s) Oral every 6 hours PRN Mild Pain (1 - 3)  senna 2Tablet(s) Oral at bedtime PRN Constipation      ALLERGIES:  Allergies    No Known Allergies    Intolerances        LABS:                        10.5   4.6   )-----------( 200      ( 18 May 2017 06:05 )             32.6     05-18    138  |  102  |  7   ----------------------------<  125<H>  3.3<L>   |  24  |  0.70    Ca    8.7      18 May 2017 06:04  Mg     1.8     05-18            RADIOLOGY & ADDITIONAL TESTS: Reviewed.

## 2017-05-18 NOTE — PROGRESS NOTE ADULT - PROBLEM SELECTOR PLAN 1
with fever 102.7, WBC 3.2, elevated lactate. RVP+ entero rhinovirus. Hx compelling for viral etiology given multiple family members with similar Sx. Concern for PNA in pt receiving chemo as well, CXR without overt focal infiltrate but possible hazy opacification at RLL. Lactate trending down but may have clearance issue. is non-septic  - c/w IV  cc/hr  -RVP=rhinovirus positive  - blood cultures: NGTD  #Dr. Ramirez following; recs appreciated  - c/w vanc/zosyn for now to cover MRSA/pseudomonas in pt receiving chemo  - Tylenol prn  - trend lactate

## 2017-05-18 NOTE — PROGRESS NOTE ADULT - SUBJECTIVE AND OBJECTIVE BOX
History of Present Illness:  Chief Complaint/Reason for Admission: Fever, cough, recent chemo	  History of Present Illness: 	  70 yo M PMH HTN, metastatic colon ca (Dx 2016, no surgery, radiation 2 months ago, chemo started 4 month sago and most recent 17, +mets to liver, has chemoport) presents with fever and cough x 3 days. Pt in usual state of health until 3 days ago when began experiencing malaise/fatigue/cough productive of green sputum/chills. Multiple family members have had similar Sx in recent days. No HA/vision changes/CP/SOB/abd pain/N/V/diarrhea/dysuria/urinary frequency/leg pain or swelling. Pt reports no bowel movement x days and mild bloating.    In ED:  Vital Signs Last 24 Hrs  T(C): 36.8, Max: 39.3 (-15 @ 17:29)  T(F): 98.2, Max: 102.7 (05-15 @ 17:29)  HR: 91 (86 - 102)  BP: 133/82 (109/70 - 133/82)  BP(mean): --  RR: 17 (17 - 18)  SpO2: 97% (95% - 99%)    WBC 3.2. no bands available. ANC even without bands 1114. Lactate 2.4. Received vanc/zosyn/500 cc bolus NS.    Review of Systems:  Review of Systems: as per HPI	      Allergies and Intolerances:        Allergies:  	No Known Allergies:     Home Medications:   * Incomplete Medication History as of 15-May-2017 18:32 documented in Prescription Writer  · 	amLODIPine:  orally , Last Dose Taken:    · 	carvedilol:  orally , Last Dose Taken:      . .    Patient History:   Past Medical History:  Colon cancer    Hypertension.    Past Surgical History:  No significant past surgical history.    Family History:  Mother  Still living? Unknown  Family history of pancreatic cancer, Age at diagnosis: Age Unknown.    Social History:  Social History (marital status, living situation, occupation, tobacco use, alcohol and drug use, and sexual history): no etoh, illicit drugs. Quit smoking 25 years ago, smoked about 10 pack years.	    Tobacco Screening:  · Core Measure Site	No	    Risk Assessment:   Present on Admission:  Deep Venous Thrombosis	no	  Pulmonary Embolus	no	    Heart Failure:  Does this patient have a history of or has been diagnosed with heart failure? no.    Hepatitis C Screen (per [facility] Department of Health, hepatitis C screening must be offered to every individual born between 1945 and 1965)	Unable to offer due to clinical condition	      Physical Exam:  Physical Exam: . Vital Signs Last 24 Hrs T(C): 37.6, Max: 38 ( @ 16:55) T(F): 99.7, Max: 100.4 ( @ 16:55) HR: 100 (89 - 100) BP: 117/71 (111/80 - 137/90) BP(mean): -- RR: 18 (16 - 18) SpO2: 92% (92% - 97%)  PHYSICAL EXAM: Constitutional: WDWN resting comfortably in bed; NAD, non toxic, unlabored breathing, AOx3, pleasant Head: NC/AT Eyes: PERRL, EOMI, anicteric sclera ENT: no oropharyngeal erythema or exudates; MMM Neck: supple; no JVD Respiratory: CTA B/L; no W/R/R, no retractions Cardiac: +S1/S2; RRR; no M/R/G Gastrointestinal: soft, NT/ND; no rebound or guarding; +BSx4 Back: no CVAT B/L Extremities: WWP, no clubbing or cyanosis; no peripheral edema Musculoskeletal: NROM x4; no joint swelling, tenderness or erythema Vascular: 2+ DPP BL Dermatologic: skin warm, dry and intact; no rashes, wounds, or scars Neurologic: AAOx3; CNII-XII grossly intact; no focal deficits Psychiatric: affect and characteristics of appearance, verbalizations, behaviors are appropriate	      Labs and Results:  Labs, Radiology, Cardiology, and Other Results: . LABS:                       10.9  3.2   )-----------( 176      ( 15 May 2017 18:27 )            33.6   05-15  137  |  102  |  10 ----------------------------<  125<H> 3.8   |  28  |  0.79  Ca    8.5      15 May 2017 18:27  TPro  7.4  /  Alb  2.4<L>  /  TBili  0.9  /  DBili  x   /  AST  27  /  ALT  20  /  AlkPhos  77  05-15  Urinalysis Basic - ( 15 May 2017 21:39 )  Color: Yellow / Appearance: Clear / S.010 / pH: x Gluc: x / Ketone: NEGATIVE  / Bili: NEGATIVE / Urobili: 0.2 E.U./dL  Blood: x / Protein: NEGATIVE mg/dL / Nitrite: NEGATIVE  Leuk Esterase: NEGATIVE / RBC: < 5 /HPF / WBC < 5 /HPF  Sq Epi: x / Non Sq Epi: Rare /HPF / Bacteria: Present /HPF   Lactate, Blood: 2.4 mmoL/L (05-15 @ 18:24)  RADIOLOGY, EKG & ADDITIONAL TESTS: Reviewed.   CXR: no overt focal opacification  ECG: NSR, no acute ischemic STT changes, poor R wave progression	  . LABS:                       10.9  3.2   )-----------( 176      ( 15 May 2017 18:27 )            33.6   05-15  137  |  102  |  10 ----------------------------<  125<H> 3.8   |  28  |  0.79  Ca    8.5      15 May 2017 18:27  TPro  7.4  /  Alb  2.4<L>  /  TBili  0.9  /  DBili  x   /  AST  27  /  ALT  20  /  AlkPhos  77  05-15   Urinalysis Basic - ( 15 May 2017 21:39 )  Color: Yellow / Appearance: Clear / S.010 / pH: x Gluc: x / Ketone: NEGATIVE  / Bili: NEGATIVE / Urobili: 0.2 E.U./dL  Blood: x / Protein: NEGATIVE mg/dL / Nitrite: NEGATIVE  Leuk Esterase: NEGATIVE / RBC: < 5 /HPF / WBC < 5 /HPF  Sq Epi: x / Non Sq Epi: Rare /HPF / Bacteria: Present /HPF      Lactate, Blood: 2.4 mmoL/L (05-15 @ 18:24)   RADIOLOGY, EKG & ADDITIONAL TESTS: Reviewed.   CXR: no overt focal opacification	    Assessment and Plan:   Assessment:  · Assessment		  70 yo M PMH HTN, metastatic colon ca (Dx 2016, no surgery, radiation 2 months ago, chemo started 4 month sago and most recent 17, +mets to liver, has chemoport) presents with fever and cough x 3 days. WBC 3.2. no bands available. ANC even without bands 1114. Lactate 2.4.     Problem/Plan - 1:  ·  Problem: Severe sepsis.  Plan: with fever 102.7, WBC 3.2, elevated lactate. RVP+ entero rhinovirus. Hx compelling for viral etiology given multiple family members with similar Sx. Concern for PNA in pt receiving chemo as well, CXR without overt focal infiltrate but possible hazy opacification at RLL. cannot r/o superimposed PNA at this time.  - s/p 1L total IV NS, c/w IV  cc/hr overnight  - fu bl cx  - c/w vanc/zosyn for now to cover MRSA/pseudomonas in pt receiving chemo  - tylenol prn  - trend lactate.   --CT chest w/ PE protocol  --CT C/A/P  --LE Dopplers    Problem/Plan - 2:  ·  Problem: Colon cancer.    - Chemotherapy on hold;  Avastin prior to discharge.    Problem/Plan - 3:  ·  Problem: Anemia.  Plan: Normocytic. No evidence bleed at present. Likely 2/2 ACD and chemo.  - fu iron studies, TSH, b12, folate.     Problem/Plan - 4:  ·  Problem: Constipation.  Plan: Mild constipation per pt. bloating. No abd pain.  - senna/colace/bisacodyl.     Problem/Plan - 5:  ·  Problem: HTN (hypertension).  Plan: VÍCTOR. BP acceptable.  - c/w home norvasc 10 qd  - holding coreg 12.5 bid in setting of sepsis, re start as clinically appropriate.

## 2017-05-19 ENCOUNTER — TRANSCRIPTION ENCOUNTER (OUTPATIENT)
Age: 72
End: 2017-05-19

## 2017-05-19 VITALS
TEMPERATURE: 99 F | OXYGEN SATURATION: 96 % | RESPIRATION RATE: 15 BRPM | DIASTOLIC BLOOD PRESSURE: 78 MMHG | HEART RATE: 95 BPM | SYSTOLIC BLOOD PRESSURE: 122 MMHG

## 2017-05-19 DIAGNOSIS — C18.9 MALIGNANT NEOPLASM OF COLON, UNSPECIFIED: ICD-10-CM

## 2017-05-19 DIAGNOSIS — J18.9 PNEUMONIA, UNSPECIFIED ORGANISM: ICD-10-CM

## 2017-05-19 DIAGNOSIS — R50.9 FEVER, UNSPECIFIED: ICD-10-CM

## 2017-05-19 DIAGNOSIS — J40 BRONCHITIS, NOT SPECIFIED AS ACUTE OR CHRONIC: ICD-10-CM

## 2017-05-19 LAB
ANION GAP SERPL CALC-SCNC: 13 MMOL/L — SIGNIFICANT CHANGE UP (ref 5–17)
BUN SERPL-MCNC: 4 MG/DL — LOW (ref 7–23)
CALCIUM SERPL-MCNC: 9.5 MG/DL — SIGNIFICANT CHANGE UP (ref 8.4–10.5)
CHLORIDE SERPL-SCNC: 99 MMOL/L — SIGNIFICANT CHANGE UP (ref 96–108)
CO2 SERPL-SCNC: 25 MMOL/L — SIGNIFICANT CHANGE UP (ref 22–31)
CREAT SERPL-MCNC: 0.7 MG/DL — SIGNIFICANT CHANGE UP (ref 0.5–1.3)
GLUCOSE SERPL-MCNC: 130 MG/DL — HIGH (ref 70–99)
HCT VFR BLD CALC: 32.4 % — LOW (ref 39–50)
HGB BLD-MCNC: 10.4 G/DL — LOW (ref 13–17)
MAGNESIUM SERPL-MCNC: 1.8 MG/DL — SIGNIFICANT CHANGE UP (ref 1.6–2.6)
MCHC RBC-ENTMCNC: 27.2 PG — SIGNIFICANT CHANGE UP (ref 27–34)
MCHC RBC-ENTMCNC: 32.1 G/DL — SIGNIFICANT CHANGE UP (ref 32–36)
MCV RBC AUTO: 84.8 FL — SIGNIFICANT CHANGE UP (ref 80–100)
PLATELET # BLD AUTO: 239 K/UL — SIGNIFICANT CHANGE UP (ref 150–400)
POTASSIUM SERPL-MCNC: 3.6 MMOL/L — SIGNIFICANT CHANGE UP (ref 3.5–5.3)
POTASSIUM SERPL-SCNC: 3.6 MMOL/L — SIGNIFICANT CHANGE UP (ref 3.5–5.3)
RBC # BLD: 3.82 M/UL — LOW (ref 4.2–5.8)
RBC # FLD: 16.6 % — SIGNIFICANT CHANGE UP (ref 10.3–16.9)
SODIUM SERPL-SCNC: 137 MMOL/L — SIGNIFICANT CHANGE UP (ref 135–145)
WBC # BLD: 5.4 K/UL — SIGNIFICANT CHANGE UP (ref 3.8–10.5)
WBC # FLD AUTO: 5.4 K/UL — SIGNIFICANT CHANGE UP (ref 3.8–10.5)

## 2017-05-19 PROCEDURE — 93970 EXTREMITY STUDY: CPT | Mod: 26

## 2017-05-19 PROCEDURE — 71275 CT ANGIOGRAPHY CHEST: CPT | Mod: 26

## 2017-05-19 PROCEDURE — 93005 ELECTROCARDIOGRAM TRACING: CPT

## 2017-05-19 PROCEDURE — 71045 X-RAY EXAM CHEST 1 VIEW: CPT

## 2017-05-19 PROCEDURE — 80048 BASIC METABOLIC PNL TOTAL CA: CPT

## 2017-05-19 PROCEDURE — 87581 M.PNEUMON DNA AMP PROBE: CPT

## 2017-05-19 PROCEDURE — 86850 RBC ANTIBODY SCREEN: CPT

## 2017-05-19 PROCEDURE — 74177 CT ABD & PELVIS W/CONTRAST: CPT

## 2017-05-19 PROCEDURE — 86900 BLOOD TYPING SEROLOGIC ABO: CPT

## 2017-05-19 PROCEDURE — 85610 PROTHROMBIN TIME: CPT

## 2017-05-19 PROCEDURE — 87086 URINE CULTURE/COLONY COUNT: CPT

## 2017-05-19 PROCEDURE — 71275 CT ANGIOGRAPHY CHEST: CPT

## 2017-05-19 PROCEDURE — 80202 ASSAY OF VANCOMYCIN: CPT

## 2017-05-19 PROCEDURE — 82728 ASSAY OF FERRITIN: CPT

## 2017-05-19 PROCEDURE — 82746 ASSAY OF FOLIC ACID SERUM: CPT

## 2017-05-19 PROCEDURE — 96374 THER/PROPH/DIAG INJ IV PUSH: CPT

## 2017-05-19 PROCEDURE — 83735 ASSAY OF MAGNESIUM: CPT

## 2017-05-19 PROCEDURE — 93970 EXTREMITY STUDY: CPT

## 2017-05-19 PROCEDURE — 87486 CHLMYD PNEUM DNA AMP PROBE: CPT

## 2017-05-19 PROCEDURE — 84443 ASSAY THYROID STIM HORMONE: CPT

## 2017-05-19 PROCEDURE — 87633 RESP VIRUS 12-25 TARGETS: CPT

## 2017-05-19 PROCEDURE — 86901 BLOOD TYPING SEROLOGIC RH(D): CPT

## 2017-05-19 PROCEDURE — 36415 COLL VENOUS BLD VENIPUNCTURE: CPT

## 2017-05-19 PROCEDURE — 84100 ASSAY OF PHOSPHORUS: CPT

## 2017-05-19 PROCEDURE — 87798 DETECT AGENT NOS DNA AMP: CPT

## 2017-05-19 PROCEDURE — 81001 URINALYSIS AUTO W/SCOPE: CPT

## 2017-05-19 PROCEDURE — 87040 BLOOD CULTURE FOR BACTERIA: CPT

## 2017-05-19 PROCEDURE — 85027 COMPLETE CBC AUTOMATED: CPT

## 2017-05-19 PROCEDURE — 80053 COMPREHEN METABOLIC PANEL: CPT

## 2017-05-19 PROCEDURE — 74177 CT ABD & PELVIS W/CONTRAST: CPT | Mod: 26

## 2017-05-19 PROCEDURE — 99285 EMERGENCY DEPT VISIT HI MDM: CPT | Mod: 25

## 2017-05-19 PROCEDURE — 83550 IRON BINDING TEST: CPT

## 2017-05-19 PROCEDURE — 85025 COMPLETE CBC W/AUTO DIFF WBC: CPT

## 2017-05-19 PROCEDURE — 82607 VITAMIN B-12: CPT

## 2017-05-19 PROCEDURE — 85730 THROMBOPLASTIN TIME PARTIAL: CPT

## 2017-05-19 PROCEDURE — 83605 ASSAY OF LACTIC ACID: CPT

## 2017-05-19 RX ORDER — BEVACIZUMAB 400 MG/16ML
500 INJECTION, SOLUTION INTRAVENOUS ONCE
Qty: 0 | Refills: 0 | Status: DISCONTINUED | OUTPATIENT
Start: 2017-05-19 | End: 2017-05-19

## 2017-05-19 RX ORDER — MAGNESIUM SULFATE 500 MG/ML
1 VIAL (ML) INJECTION ONCE
Qty: 0 | Refills: 0 | Status: COMPLETED | OUTPATIENT
Start: 2017-05-19 | End: 2017-05-19

## 2017-05-19 RX ORDER — POTASSIUM CHLORIDE 20 MEQ
40 PACKET (EA) ORAL ONCE
Qty: 0 | Refills: 0 | Status: COMPLETED | OUTPATIENT
Start: 2017-05-19 | End: 2017-05-19

## 2017-05-19 RX ADMIN — POLYETHYLENE GLYCOL 3350 17 GRAM(S): 17 POWDER, FOR SOLUTION ORAL at 05:58

## 2017-05-19 RX ADMIN — HEPARIN SODIUM 5000 UNIT(S): 5000 INJECTION INTRAVENOUS; SUBCUTANEOUS at 05:58

## 2017-05-19 RX ADMIN — Medication 100 MILLIGRAM(S): at 05:58

## 2017-05-19 RX ADMIN — PIPERACILLIN AND TAZOBACTAM 200 GRAM(S): 4; .5 INJECTION, POWDER, LYOPHILIZED, FOR SOLUTION INTRAVENOUS at 03:34

## 2017-05-19 RX ADMIN — IOHEXOL 50 MILLILITER(S): 300 INJECTION, SOLUTION INTRAVENOUS at 10:10

## 2017-05-19 RX ADMIN — HEPARIN SODIUM 5000 UNIT(S): 5000 INJECTION INTRAVENOUS; SUBCUTANEOUS at 15:43

## 2017-05-19 RX ADMIN — Medication 250 MILLIGRAM(S): at 11:36

## 2017-05-19 RX ADMIN — PIPERACILLIN AND TAZOBACTAM 200 GRAM(S): 4; .5 INJECTION, POWDER, LYOPHILIZED, FOR SOLUTION INTRAVENOUS at 15:42

## 2017-05-19 RX ADMIN — Medication 100 GRAM(S): at 09:19

## 2017-05-19 RX ADMIN — Medication 40 MILLIEQUIVALENT(S): at 09:19

## 2017-05-19 RX ADMIN — PIPERACILLIN AND TAZOBACTAM 200 GRAM(S): 4; .5 INJECTION, POWDER, LYOPHILIZED, FOR SOLUTION INTRAVENOUS at 08:28

## 2017-05-19 RX ADMIN — Medication 250 MILLIGRAM(S): at 00:55

## 2017-05-19 NOTE — DISCHARGE NOTE ADULT - HOSPITAL COURSE
70 yo M PMH HTN, metastatic colon ca (Dx 12/2016, no surgery, radiation 2 months ago, chemo started 4 month sago and most recent 5/2/17, +mets to liver, has chemoport) presents with fever and cough x 3 days. ultiple family members have had similar Sx in recent days. No HA/vision changes/CP/SOB/abd pain/N/V/diarrhea/dysuria/urinary frequency/leg pain or swelling. In ED: TMax: 102.7 , vitals otherwise unremarkable. WBC 3.2. no bands available. ANC even without bands 1114. Lactate 2.4. Received vanc/zosyn/500 cc bolus NS. Admitted to F. Emperically continued on vanc/zosyn. seen by Dr. Frost who noted no issues with chemoport. Lactate trended down. sepsis w/u did not reveal source of infection. 72 yo M PMH HTN, metastatic colon ca (Dx 12/2016, no surgery, radiation 2 months ago, chemo started 4 month sago and most recent 5/2/17, +mets to liver, has chemoport) presents with fever and cough x 3 days. ultiple family members have had similar Sx in recent days. No HA/vision changes/CP/SOB/abd pain/N/V/diarrhea/dysuria/urinary frequency/leg pain or swelling. In ED: TMax: 102.7 , vitals otherwise unremarkable. WBC 3.2. no bands available. ANC even without bands 1114. Lactate 2.4. Received vanc/zosyn/500 cc bolus NS. Admitted to [facility]. Emperically continued on vanc/zosyn. seen by Dr. Frost who noted no issues with chemoport. Lactate trended down. sepsis w/u did not reveal source of infection. Patient had CT Pe protocol, CT chest abdomen and pelvis which were all negative, including for infection. Patient remained afebrile and will be discharged home with follow up to Dr. Velázquez and Dr. Ramirez.

## 2017-05-19 NOTE — PROGRESS NOTE ADULT - SUBJECTIVE AND OBJECTIVE BOX
INTERVAL HPI/OVERNIGHT EVENTS:    PAST MEDICAL & SURGICAL HISTORY:  Colon cancer  Liver cancer  Hypertension  No significant past surgical history      FAMILY HISTORY:  Family history of pancreatic cancer (Mother)      SOCIAL HISTORY:    REVIEW OF SYSTEMS      General:ICU Vital Signs Last 24 Hrs  T(C): 37.3, Max: 37.9 (05-18 @ 16:55)  T(F): 99.2, Max: 100.3 (05-18 @ 16:55)  HR: 98 (96 - 100)  BP: 117/75 (117/71 - 125/75)  BP(mean): --  ABP: --  ABP(mean): --  RR: 16 (16 - 18)  SpO2: 94% (92% - 95%)  	    Skin/Breast: negative  	  Ophthalmologic: negative  	  ENMT:	no pharygitis, no lymphnodes    Respiratory and Thorax: cough, dyspnea  	  Cardiovascular:	no chest pain    Gastrointestinal:	 no abdominal pain/ metastatic colon ca    Genitourinary:	no dysuria    Musculoskeletal: neg	    Neurological:	neg    Psychiatric:	depression    Hematology/Lymphatics: neg	    Endocrine: neg	    Allergic/Immunologic:	  No Known Allergies    Vital Signs Last 24 Hrs  T(C): 37.3, Max: 37.9 (05-18 @ 16:55)  T(F): 99.2, Max: 100.3 (05-18 @ 16:55)  HR: 98 (96 - 100)  BP: 117/75 (117/71 - 125/75)  BP(mean): --  RR: 16 (16 - 18)  SpO2: 94% (92% - 95%)            I&O's Detail  I & Os for 24h ending 19 May 2017 07:00  =============================================  IN:    sodium chloride 0.9%: 1500 ml    IV PiggyBack: 500 ml    Total IN: 2000 ml  ---------------------------------------------  OUT:    Voided: 1300 ml    Total OUT: 1300 ml  ---------------------------------------------  Total NET: 700 ml    I & Os for current day (as of 19 May 2017 15:48)  =============================================  IN:    IV PiggyBack: 700 ml    Total IN: 700 ml  ---------------------------------------------  OUT:    Voided: 350 ml    Total OUT: 350 ml  ---------------------------------------------  Total NET: 350 ml      PHYSICAL EXAM:      Constitutional: intermittant high fever    Eyes: SISSY    ENMT: no pharyngitis    Neck: no stiffness    Breasts:neg    Back:neg    Respiratory: rales rt base    Cardiovascular: S1S2, No murmur    Gastrointestinal: soft    Genitourinary:neg    Rectal:    Extremities: no arterial or venous insufficiency    Vascular: neg    Neurological: non focal    Skin:no rash    Lymph Nodes: neg    Musculoskeletal: neg    Psychiatric: depression        MEDICATIONS  (STANDING):  heparin  Injectable 5000Unit(s) SubCutaneous every 8 hours  docusate sodium 100milliGRAM(s) Oral three times a day  piperacillin/tazobactam IVPB. 4.5Gram(s) IV Intermittent every 6 hours  polyethylene glycol 3350 17Gram(s) Oral two times a day  vancomycin  IVPB 2000milliGRAM(s) IV Intermittent every 12 hours  bevacizumab IVPB 500milliGRAM(s) IV Intermittent once    MEDICATIONS  (PRN):  acetaminophen   Tablet 650milliGRAM(s) Oral every 6 hours PRN For Temp greater than 38 C (100.4 F)  acetaminophen   Tablet. 650milliGRAM(s) Oral every 6 hours PRN Mild Pain (1 - 3)  senna 2Tablet(s) Oral at bedtime PRN Constipation      Allergies    No Known Allergies    Intolerances      heparin  Injectable 5000Unit(s) SubCutaneous every 8 hours  acetaminophen   Tablet 650milliGRAM(s) Oral every 6 hours PRN  acetaminophen   Tablet. 650milliGRAM(s) Oral every 6 hours PRN  senna 2Tablet(s) Oral at bedtime PRN  docusate sodium 100milliGRAM(s) Oral three times a day  piperacillin/tazobactam IVPB. 4.5Gram(s) IV Intermittent every 6 hours  polyethylene glycol 3350 17Gram(s) Oral two times a day  vancomycin  IVPB 2000milliGRAM(s) IV Intermittent every 12 hours  bevacizumab IVPB 500milliGRAM(s) IV Intermittent once    LABS:                        10.4   5.4   )-----------( 239      ( 19 May 2017 06:33 )             32.4     05-19    137  |  99  |  4<L>  ----------------------------<  130<H>  3.6   |  25  |  0.70    Ca    9.5      19 May 2017 06:33  Mg     1.8     05-19            RADIOLOGY & ADDITIONAL STUDIES: CT Chest is pending  Assessemnt and plan

## 2017-05-19 NOTE — PROGRESS NOTE ADULT - PROBLEM SELECTOR PLAN 6
DASH/TLC  -replete electrolytes PRN

## 2017-05-19 NOTE — PROGRESS NOTE ADULT - SUBJECTIVE AND OBJECTIVE BOX
INTERVAL HPI/OVERNIGHT EVENTS: ordered for CT c/a/p    SUBJECTIVE: Patient seen and examined at bedside.     OBJECTIVE:    VITAL SIGNS:  ICU Vital Signs Last 24 Hrs  T(C): 37.3, Max: 37.9 (05-18 @ 16:55)  T(F): 99.2, Max: 100.3 (05-18 @ 16:55)  HR: 98 (95 - 100)  BP: 117/75 (117/71 - 137/90)  BP(mean): --  ABP: --  ABP(mean): --  RR: 16 (16 - 18)  SpO2: 94% (92% - 96%)      I & Os for 24h ending 05-19 @ 07:00  =============================================  IN: 2000 ml / OUT: 1300 ml / NET: 700 ml    I & Os for current day (as of 05-19 @ 10:12)  =============================================  IN: 0 ml / OUT: 350 ml / NET: -350 ml    CAPILLARY BLOOD GLUCOSE      PHYSICAL EXAM:    General: NAD  HEENT: NC/AT; PERRL, clear conjunctiva  Neck: supple  Respiratory: CTA b/l  Cardiovascular: +S1/S2; RRR  Abdomen: soft, NT/ND; +BS x4  Extremities: WWP, 2+ peripheral pulses b/l; no LE edema  Skin: normal color and turgor; no rash  Neurological:     MEDICATIONS:  MEDICATIONS  (STANDING):  heparin  Injectable 5000Unit(s) SubCutaneous every 8 hours  docusate sodium 100milliGRAM(s) Oral three times a day  piperacillin/tazobactam IVPB. 4.5Gram(s) IV Intermittent every 6 hours  polyethylene glycol 3350 17Gram(s) Oral two times a day  vancomycin  IVPB 2000milliGRAM(s) IV Intermittent every 12 hours    MEDICATIONS  (PRN):  acetaminophen   Tablet 650milliGRAM(s) Oral every 6 hours PRN For Temp greater than 38 C (100.4 F)  acetaminophen   Tablet. 650milliGRAM(s) Oral every 6 hours PRN Mild Pain (1 - 3)  senna 2Tablet(s) Oral at bedtime PRN Constipation      ALLERGIES:  Allergies    No Known Allergies    Intolerances        LABS:                        10.4   5.4   )-----------( 239      ( 19 May 2017 06:33 )             32.4     05-19    137  |  99  |  4<L>  ----------------------------<  130<H>  3.6   |  25  |  0.70    Ca    9.5      19 May 2017 06:33  Mg     1.8     05-19            RADIOLOGY & ADDITIONAL TESTS: Reviewed. INTERVAL HPI/OVERNIGHT EVENTS: ordered for CT c/a/p    SUBJECTIVE: Patient seen and examined at bedside.     OBJECTIVE:    VITAL SIGNS:  ICU Vital Signs Last 24 Hrs  T(C): 37.3, Max: 37.9 (05-18 @ 16:55)  T(F): 99.2, Max: 100.3 (05-18 @ 16:55)  HR: 98 (95 - 100)  BP: 117/75 (117/71 - 137/90)  BP(mean): --  ABP: --  ABP(mean): --  RR: 16 (16 - 18)  SpO2: 94% (92% - 96%)      I & Os for 24h ending 05-19 @ 07:00  =============================================  IN: 2000 ml / OUT: 1300 ml / NET: 700 ml    I & Os for current day (as of 05-19 @ 10:12)  =============================================  IN: 0 ml / OUT: 350 ml / NET: -350 ml    CAPILLARY BLOOD GLUCOSE      PHYSICAL EXAM:    Constitutional: WDWN resting comfortably in bed; NAD, non toxic, unlabored breathing, AOx3, pleasant  Head: NC/AT  Eyes: PERRL, EOMI, anicteric sclera  ENT: no oropharyngeal erythema or exudates; MMM  Neck: supple; no JVD  Respiratory: CTA B/L; no W/R/R, no retractions  Cardiac: +S1/S2; RRR; no M/R/G  Gastrointestinal: soft, NT/ND; no rebound or guarding; +BSx4  Back: no CVAT B/L  Extremities: WWP, no clubbing or cyanosis; no peripheral edema  Musculoskeletal: NROM x4; no joint swelling, tenderness or erythema  Vascular: 2+ DPP BL  Dermatologic: skin warm, dry and intact; no rashes, wounds, or scars  Neurologic: AAOx3; CNII-XII grossly intact; no focal deficits      MEDICATIONS:  MEDICATIONS  (STANDING):  heparin  Injectable 5000Unit(s) SubCutaneous every 8 hours  docusate sodium 100milliGRAM(s) Oral three times a day  piperacillin/tazobactam IVPB. 4.5Gram(s) IV Intermittent every 6 hours  polyethylene glycol 3350 17Gram(s) Oral two times a day  vancomycin  IVPB 2000milliGRAM(s) IV Intermittent every 12 hours    MEDICATIONS  (PRN):  acetaminophen   Tablet 650milliGRAM(s) Oral every 6 hours PRN For Temp greater than 38 C (100.4 F)  acetaminophen   Tablet. 650milliGRAM(s) Oral every 6 hours PRN Mild Pain (1 - 3)  senna 2Tablet(s) Oral at bedtime PRN Constipation      ALLERGIES:  Allergies    No Known Allergies    Intolerances        LABS:                        10.4   5.4   )-----------( 239      ( 19 May 2017 06:33 )             32.4     05-19    137  |  99  |  4<L>  ----------------------------<  130<H>  3.6   |  25  |  0.70    Ca    9.5      19 May 2017 06:33  Mg     1.8     05-19            RADIOLOGY & ADDITIONAL TESTS: Reviewed.

## 2017-05-19 NOTE — CHART NOTE - NSCHARTNOTEFT_GEN_A_CORE
Mr Duke well known to me.  S/P radioembolization for bilobar hepatic colorectal metastases.  Admitted after two days of fever at home.  Asked to examine port placed by IR.  Skin surrounding port is normal. No fluctuance, pain, erythema or any sign of infection.  Awaiting blood culture.  No growth at 12 hours.  Discussed with Dr Velázquez.
PGY-1 Transfer Note    Patient afebrile overnight and during the day. CT chest abdomen and pelvis unremarkable for infectious etiologies of his low-grade temperature. Findings discussed with Dr. Velázquez, who elected to give the patient a treatment of Avastin today prior to discharge. Risks and benefits of Avastin treatment in the setting of the patient's malignancy were discussed with the patient and family at bedside per Dr. Velázquez. Will follow-up ID recs prior to discharge

## 2017-05-19 NOTE — PROGRESS NOTE ADULT - PROBLEM SELECTOR PLAN 7
Heparin subc    CODE: FULL  DISPO: continue management on RMF Heparin subc    CODE: FULL  DISPO: possible d/c today following scans

## 2017-05-19 NOTE — DISCHARGE NOTE ADULT - CARE PLAN
Principal Discharge DX:	Severe sepsis  Goal:	Follow up with Dr. Ramirez  Secondary Diagnosis:	Colon cancer  Goal:	Follow up with Dr. Velázquez  Instructions for follow-up, activity and diet:	You have a history of colon cancer. Please follow up with Dr. Velázquez for continued management.  Secondary Diagnosis:	Anemia  Instructions for follow-up, activity and diet:	You were found to have a low blood concentration or anemia on admission. This anemia is most likely due to your malignancy. Please seek immediate care or call 911 if you have trouble swallowing because of pain in your mouth and throat, shortness of breath even when you rest, notice blood in your bowel movements or vomit, or are too dizzy to stand up.  Secondary Diagnosis:	HTN (hypertension)  Goal:	maintain normal blood pressure  Instructions for follow-up, activity and diet:	You have a known history of high blood pressure prior to your admission. High blood pressure can cause damage to your heart and kidneys and increases your risk of heart attack and stroke. To avoid this, It is important that you continue to take your medications when you are discharged so that you can continue to control your blood pressure. Additionally be sure to follow up with your PCP on a regular basis to make sure your blood pressure continues to be well controlled. If you experience sudden onset blurry vision, nausea, vomiting, chest pain, shortness of breath, or palpitations, please go to the nearest emergency room. Principal Discharge DX:	Severe sepsis  Goal:	Follow up with Dr. Ramirez  Instructions for follow-up, activity and diet:	You were admitted with a fever and noted to meet criteria for severe sepsis. All of your workup was negative while you were empirically treated on antibiotics. Please follow up with Dr. Ramirez following discharge.  Secondary Diagnosis:	Colon cancer  Goal:	Follow up with Dr. Velázquez  Instructions for follow-up, activity and diet:	You have a history of colon cancer. Please follow up with Dr. Velázquez for continued management.  Secondary Diagnosis:	Anemia  Instructions for follow-up, activity and diet:	You were found to have a low blood concentration or anemia on admission. This anemia is most likely due to your malignancy. Please seek immediate care or call 911 if you have trouble swallowing because of pain in your mouth and throat, shortness of breath even when you rest, notice blood in your bowel movements or vomit, or are too dizzy to stand up.  Secondary Diagnosis:	HTN (hypertension)  Goal:	maintain normal blood pressure  Instructions for follow-up, activity and diet:	You have a known history of high blood pressure prior to your admission. High blood pressure can cause damage to your heart and kidneys and increases your risk of heart attack and stroke. To avoid this, It is important that you continue to take your medications when you are discharged so that you can continue to control your blood pressure. Additionally be sure to follow up with your PCP on a regular basis to make sure your blood pressure continues to be well controlled. If you experience sudden onset blurry vision, nausea, vomiting, chest pain, shortness of breath, or palpitations, please go to the nearest emergency room.

## 2017-05-19 NOTE — DISCHARGE NOTE ADULT - MEDICATION SUMMARY - MEDICATIONS TO TAKE
I will START or STAY ON the medications listed below when I get home from the hospital:    carvedilol  --  by mouth   -- Indication: For HTN (hypertension)    amLODIPine  --  by mouth   -- Indication: For HTN (hypertension)

## 2017-05-19 NOTE — DISCHARGE NOTE ADULT - CARE PROVIDER_API CALL
Guerline Velázquez), Medicine  [address] [address]  Phone: [phone]  Fax: [phone]    Ita Ramirez), Infectious Disease; Internal Medicine  [address] [address]  Phone: [phone]  Fax: [phone]

## 2017-05-19 NOTE — PROGRESS NOTE ADULT - ASSESSMENT
fever of unknown origin, possible upper respiratory infection. metastatic colon cancer
70 yo M PMH HTN, metastatic colon ca (Dx 12/2016, no surgery, radiation 2 months ago, chemo started 4 month sago and most recent 5/2/17, +mets to liver, has chemoport) presents with fever and cough x 3 days. WBC 3.2. no bands available. ANC even without bands 1114. Lactate 2.4.
Colon CA  On chemo  Patient immunocompromised  Fever - appears improved   No apparent bacterial infection  Apparent viral respiratory infection (Entero/Rhino)  Elevated lactate level - ?clearance issue    RECOMMEND  Continue IV abx and hospital observation next 24 hours
Fever  Patient immunocompromised with colon CA and chemo  No apparent bacterial infection  Possible viral syndrome    RECOMMEND    OK to D/C abx and home tomorrow AM.  If patient prefers to leave tonight - OK from my perspective  Close outpatient follow-up
Full note to follow    RECOMMEND  Agree with plans for Chest CT with PE protocol  R/O DVTs  Also perform A/P CT  Check CMV viral load  OK to continue abx for now

## 2017-05-19 NOTE — PROGRESS NOTE ADULT - PROBLEM SELECTOR PLAN 2
VÍCTOR.  - walter Steward, was planned for chemo week of admission per family but may postpone VÍCTOR.  - fu Dr. tSeward, was planned for chemo week of admission per family but may postpone  -will go for CT c/a/p

## 2017-05-19 NOTE — PROGRESS NOTE ADULT - SUBJECTIVE AND OBJECTIVE BOX
INTERVAL HPI/OVERNIGHT EVENTS:  Reviewed  Stable without complaints    MEDICATIONS  (STANDING):  heparin  Injectable 5000Unit(s) SubCutaneous every 8 hours  docusate sodium 100milliGRAM(s) Oral three times a day  piperacillin/tazobactam IVPB. 4.5Gram(s) IV Intermittent every 6 hours  polyethylene glycol 3350 17Gram(s) Oral two times a day  vancomycin  IVPB 2000milliGRAM(s) IV Intermittent every 12 hours  bevacizumab IVPB 500milliGRAM(s) IV Intermittent once    MEDICATIONS  (PRN):  acetaminophen   Tablet 650milliGRAM(s) Oral every 6 hours PRN For Temp greater than 38 C (100.4 F)  acetaminophen   Tablet. 650milliGRAM(s) Oral every 6 hours PRN Mild Pain (1 - 3)  senna 2Tablet(s) Oral at bedtime PRN Constipation      Allergies    No Known Allergies    EXAM  Vital Signs Last 24 Hrs  T(C): 37.2, Max: 37.6 (05-18 @ 21:20)  T(F): 98.9, Max: 99.7 (05-18 @ 21:20)  HR: 95 (95 - 100)  BP: 122/78 (117/71 - 125/75)  BP(mean): --  RR: 15 (15 - 18)  SpO2: 96% (92% - 96%)  Awake and alert  No rash  Decreased BSs at right base  Otherwise unchanged    LABS:                        10.4   5.4   )-----------( 239      ( 19 May 2017 06:33 )             32.4     05-19    137  |  99  |  4<L>  ----------------------------<  130<H>  3.6   |  25  |  0.70    Ca    9.5      19 May 2017 06:33  Mg     1.8     05-19      MICROBIOLOGY:    Culture - Urine (05.16.17 @ 07:49)    Specimen Source: .Urine Clean Catch (Midstream)    Culture Results:   No growth    Culture - Blood (05.16.17 @ 00:50)    Specimen Source: .Blood Blood    Culture Results:   No growth at 3 days.    Culture - Blood (05.15.17 @ 19:50)    Specimen Source: .Blood Blood-Peripheral    Culture Results:   No growth at 3 days.    Culture - Blood (05.15.17 @ 19:50)    Specimen Source: .Blood Blood-Peripheral    Culture Results:   No growth at 3 days.      RADIOLOGY & ADDITIONAL STUDIES:      EXAM:  CT ABDOMEN AND PELVIS OC IC                          EXAM:  CT ANGIO CHEST PE PROTOCOL IC                          PROCEDURE DATE:  05/19/2017     125  [employer] 350   5    INTERPRETATION:  CTA (CT angiography) of the CHEST, CT of the ABDOMEN and   PELVIS with intravenous contrast dated 5/19/2017 1:54 PM    INDICATION: Fever of unknown origin. Assess for pulmonary embolism.   History of colon cancer with liver metastases.    TECHNIQUE: CT angiography of the chest was performed during bolus   injection of intravenous contrast.  Post-processing including the   production of axial, coronal and sagittal multiplanar reformatted images   and axial and coronal maximum intensity projections (MIPs) was performed.    PRIOR STUDY: Abdomen and pelvis CT dated 4/25/2017.    FINDINGS: No pulmonary embolism is seen. Enlarged main pulmonary artery   measuring 3.7 cm, consistent with pulmonary hypertension. Mild thoracic   aortic atherosclerotic calcification. The heart is normal in size. Small   pericardial effusion is seen. A few mildly enlarged mediastinal lymph   nodes are present, including a 1.1 cm left paratracheal lymph node and a   1.9 cm subcarinal lymph node. No hilar or axillary lymphadenopathy is   seen. Bilateral thyroid nodules, measuring up to 0.9 cm on the left    No pleural effusions are seen. 0.2 cm right upper lobe nodule, image 84   series 4. Small cluster of tree-in-bud nodules in the superior segment of   the right lower lobe, suggesting small airways disease. Linear   atelectasis right lower lobe.      Again seen are multiple large hypodense hepatic masses within the   enlarged liver, consistent with metastases, not significantly changed.    No radiopaque stones are seen in the gallbladder. Small pericholecystic   fluid.  The pancreas is normal in appearance.  The spleen is enlarged,   measuring 18.4 cm in AP dimension.    The adrenal glands are unremarkable. Bilateral renal cysts and   subcentimeter hypodensities that are too small to characterize.    No abdominal aortic aneurysm is seen. Moderate atherosclerotic   calcifications of the abdominal aorta and its major branches. No   lymphadenopathy is seen.     Evaluation of the bowel demonstrates under distention of the rectosigmoid   colon which limits evaluation. No ascites is seen.     Images of the pelvis demonstrate the prostate and seminal vesicles to be   unremarkable. Mild presacral edema. Small bilateral fat-containing   inguinal hernias.    Evaluation of the osseous structures demonstrates moderate degenerative   changes.      IMPRESSION:   No pulmonary embolism.    Pulmonary hypertension.    Mild mediastinal lymphadenopathy.    Enlarged liver containing multiple large metastases, not significantly   changed.    Splenomegaly.    Small pericholecystic fluid without gallbladder distention or wall   thickening. No definite evidence of acute cholecystitis.     EXAM:  US DPLX LWR EXT VEINS COMPL BI                          PROCEDURE DATE:  05/19/2017         INTERPRETATION:    VENOUS DUPLEX DOPPLER OF BOTH LOWER EXTREMITIES dated 5/19/2017 2:15 PM    INDICATION: Fever; rule out DVT    TECHNIQUE: Duplex Doppler evaluation including gray-scale ultrasound   imaging, color flow Doppler imaging, and Doppler spectral analysis of the   veins of both lower extremities was performed.     COMPARISON: None    FINDINGS:    Thigh veins: The common femoral, femoral, popliteal, proximal greater   saphenous, and proximal deep femoral veins are patent and free of   thrombus bilaterally. The veins are normally compressible and have normal   phasic flow and augmentation response.    Calf veins: The paired peroneal and posterior tibial calf veins are   patent bilaterally.    IMPRESSION:  No deep vein thrombosis seen.

## 2017-05-19 NOTE — PROGRESS NOTE ADULT - SUBJECTIVE AND OBJECTIVE BOX
History of Present Illness:  Chief Complaint/Reason for Admission: Fever, cough, recent chemo	  History of Present Illness: 	  70 yo M PMH HTN, metastatic colon ca (Dx 2016, no surgery, radiation 2 months ago, chemo started 4 month sago and most recent 17, +mets to liver, has chemoport) presents with fever and cough x 3 days. Pt in usual state of health until 3 days ago when began experiencing malaise/fatigue/cough productive of green sputum/chills. Multiple family members have had similar Sx in recent days. No HA/vision changes/CP/SOB/abd pain/N/V/diarrhea/dysuria/urinary frequency/leg pain or swelling. Pt reports no bowel movement x days and mild bloating.    In ED:  Vital Signs Last 24 Hrs  T(C): 36.8, Max: 39.3 (-15 @ 17:29)  T(F): 98.2, Max: 102.7 (05-15 @ 17:29)  HR: 91 (86 - 102)  BP: 133/82 (109/70 - 133/82)  BP(mean): --  RR: 17 (17 - 18)  SpO2: 97% (95% - 99%)    WBC 3.2. no bands available. ANC even without bands 1114. Lactate 2.4. Received vanc/zosyn/500 cc bolus NS.    Review of Systems:  Review of Systems: as per HPI	      Allergies and Intolerances:        Allergies:  	No Known Allergies:     Home Medications:   * Incomplete Medication History as of 15-May-2017 18:32 documented in Prescription Writer  · 	amLODIPine:  orally , Last Dose Taken:    · 	carvedilol:  orally , Last Dose Taken:      . .    Patient History:   Past Medical History:  Colon cancer    Hypertension.    Past Surgical History:  No significant past surgical history.    Family History:  Mother  Still living? Unknown  Family history of pancreatic cancer, Age at diagnosis: Age Unknown.    Social History:  Social History (marital status, living situation, occupation, tobacco use, alcohol and drug use, and sexual history): no etoh, illicit drugs. Quit smoking 25 years ago, smoked about 10 pack years.	    Tobacco Screening:  · Core Measure Site	No	    Risk Assessment:   Present on Admission:  Deep Venous Thrombosis	no	  Pulmonary Embolus	no	    Heart Failure:  Does this patient have a history of or has been diagnosed with heart failure? no.    Hepatitis C Screen (per [facility] Department [facility], hepatitis C screening must be offered to every individual born between 1945 and 1965)	Unable to offer due to clinical condition	      Physical Exam:  Physical Exam: . Vital Signs Last 24 Hrs T(C): 37.6, Max: 38 ( @ 16:55) T(F): 99.7, Max: 100.4 ( @ 16:55) HR: 100 (89 - 100) BP: 117/71 (111/80 - 137/90) BP(mean): -- RR: 18 (16 - 18) SpO2: 92% (92% - 97%)  PHYSICAL EXAM: Constitutional: WDWN resting comfortably in bed; NAD, non toxic, unlabored breathing, AOx3, pleasant Head: NC/AT Eyes: PERRL, EOMI, anicteric sclera ENT: no oropharyngeal erythema or exudates; MMM Neck: supple; no JVD Respiratory: CTA B/L; no W/R/R, no retractions Cardiac: +S1/S2; RRR; no M/R/G Gastrointestinal: soft, NT/ND; no rebound or guarding; +BSx4 Back: no CVAT B/L Extremities: WWP, no clubbing or cyanosis; no peripheral edema Musculoskeletal: NROM x4; no joint swelling, tenderness or erythema Vascular: 2+ DPP BL Dermatologic: skin warm, dry and intact; no rashes, wounds, or scars Neurologic: AAOx3; CNII-XII grossly intact; no focal deficits Psychiatric: affect and characteristics of appearance, verbalizations, behaviors are appropriate	      Labs and Results:  Labs, Radiology, Cardiology, and Other Results: . LABS:                       10.9  3.2   )-----------( 176      ( 15 May 2017 18:27 )            33.6   05-15  137  |  102  |  10 ----------------------------<  125<H> 3.8   |  28  |  0.79  Ca    8.5      15 May 2017 18:27  TPro  7.4  /  Alb  2.4<L>  /  TBili  0.9  /  DBili  x   /  AST  27  /  ALT  20  /  AlkPhos  77  05-15  Urinalysis Basic - ( 15 May 2017 21:39 )  Color: Yellow / Appearance: Clear / S.010 / pH: x Gluc: x / Ketone: NEGATIVE  / Bili: NEGATIVE / Urobili: 0.2 E.U./dL  Blood: x / Protein: NEGATIVE mg/dL / Nitrite: NEGATIVE  Leuk Esterase: NEGATIVE / RBC: < 5 /HPF / WBC < 5 /HPF  Sq Epi: x / Non Sq Epi: Rare /HPF / Bacteria: Present /HPF   Lactate, Blood: 2.4 mmoL/L (05-15 @ 18:24)  RADIOLOGY, EKG & ADDITIONAL TESTS: Reviewed.   CXR: no overt focal opacification  ECG: NSR, no acute ischemic STT changes, poor R wave progression	  . LABS:                       10.9  3.2   )-----------( 176      ( 15 May 2017 18:27 )            33.6   05-15  137  |  102  |  10 ----------------------------<  125<H> 3.8   |  28  |  0.79  Ca    8.5      15 May 2017 18:27  TPro  7.4  /  Alb  2.4<L>  /  TBili  0.9  /  DBili  x   /  AST  27  /  ALT  20  /  AlkPhos  77  05-15   Urinalysis Basic - ( 15 May 2017 21:39 )  Color: Yellow / Appearance: Clear / S.010 / pH: x Gluc: x / Ketone: NEGATIVE  / Bili: NEGATIVE / Urobili: 0.2 E.U./dL  Blood: x / Protein: NEGATIVE mg/dL / Nitrite: NEGATIVE  Leuk Esterase: NEGATIVE / RBC: < 5 /HPF / WBC < 5 /HPF  Sq Epi: x / Non Sq Epi: Rare /HPF / Bacteria: Present /HPF      Lactate, Blood: 2.4 mmoL/L (05-15 @ 18:24)   RADIOLOGY, EKG & ADDITIONAL TESTS: Reviewed.   CXR: no overt focal opacification	    Assessment and Plan:   Assessment:  · Assessment		  70 yo M PMH HTN, metastatic colon ca (Dx 2016, no surgery, radiation 2 months ago, chemo started 4 month sago and most recent 17, +mets to liver, has chemoport) presents with fever and cough x 3 days. WBC 3.2. no bands available. ANC even without bands 1114. Lactate 2.4.     Problem/Plan - 1:  ·  Problem: Severe sepsis.  Plan: with fever 102.7, WBC 3.2, elevated lactate. RVP+ entero rhinovirus. Hx compelling for viral etiology given multiple family members with similar Sx. Concern for PNA in pt receiving chemo as well, CXR without overt focal infiltrate but possible hazy opacification at RLL. cannot r/o superimposed PNA at this time.  - s/p 1L total IV NS, c/w IV  cc/hr overnight  - fu bl cx  - c/w vanc/zosyn for now to cover MRSA/pseudomonas in pt receiving chemo  - tylenol prn  - trend lactate.   --CT chest w/ PE protocol  --CT C/A/P  --LE Dopplers    Problem/Plan - 2:  ·  Problem: Colon cancer.    - Chemotherapy on hold;  Avastin prior to discharge.    Problem/Plan - 3:  ·  Problem: Anemia.  Plan: Normocytic. No evidence bleed at present. Likely 2/2 ACD and chemo.  - fu iron studies, TSH, b12, folate.     Problem/Plan - 4:  ·  Problem: Constipation.  Plan: Mild constipation per pt. bloating. No abd pain.  - senna/colace/bisacodyl.     Problem/Plan - 5:  ·  Problem: HTN (hypertension).  Plan: VÍCTOR. BP acceptable.  - c/w home norvasc 10 qd  - holding coreg 12.5 bid in setting of sepsis, re start as clinically appropriate.

## 2017-05-19 NOTE — DISCHARGE NOTE ADULT - PLAN OF CARE
Follow up with Dr. Ramirez maintain normal blood pressure Follow up with Dr. Velázquez You have a history of colon cancer. Please follow up with Dr. Velázquez for continued management. You were found to have a low blood concentration or anemia on admission. This anemia is most likely due to your malignancy. Please seek immediate care or call 911 if you have trouble swallowing because of pain in your mouth and throat, shortness of breath even when you rest, notice blood in your bowel movements or vomit, or are too dizzy to stand up. You have a known history of high blood pressure prior to your admission. High blood pressure can cause damage to your heart and kidneys and increases your risk of heart attack and stroke. To avoid this, It is important that you continue to take your medications when you are discharged so that you can continue to control your blood pressure. Additionally be sure to follow up with your PCP on a regular basis to make sure your blood pressure continues to be well controlled. If you experience sudden onset blurry vision, nausea, vomiting, chest pain, shortness of breath, or palpitations, please go to the nearest emergency room. You were admitted with a fever and noted to meet criteria for severe sepsis. All of your workup was negative while you were empirically treated on antibiotics. Please follow up with Dr. Ramirez following discharge.

## 2017-05-19 NOTE — PROGRESS NOTE ADULT - PROVIDER SPECIALTY LIST ADULT
Heme/Onc
Infectious Disease
Internal Medicine
Pulmonology

## 2017-05-19 NOTE — PROGRESS NOTE ADULT - PROBLEM SELECTOR PLAN 1
with fever 102.7, WBC 3.2, elevated lactate. RVP+ entero rhinovirus. Hx compelling for viral etiology given multiple family members with similar Sx. Concern for PNA in pt receiving chemo as well, CXR without overt focal infiltrate but possible hazy opacification at RLL. Lactate trending down but may have clearance issue. is non-septic  - c/w IV  cc/hr  -RVP=rhinovirus positive  - blood cultures: NGTD  #Dr. Ramirez following; recs appreciated  - c/w vanc/zosyn for now to cover MRSA/pseudomonas in pt receiving chemo  - Tylenol prn  - trend lactate with fever 102.7, WBC 3.2, elevated lactate. RVP+ entero rhinovirus. Hx compelling for viral etiology given multiple family members with similar Sx. Concern for PNA in pt receiving chemo as well, CXR without overt focal infiltrate but possible hazy opacification at RLL. Lactate trending down but may have clearance issue. is non-septic  - c/w IV  cc/hr  -RVP=rhinovirus positive  - blood cultures: NGTD  #Dr. Ramirez following; recs appreciated  - c/w vanc/zosyn for now to cover MRSA/pseudomonas in pt receiving chemo  - Tylenol prn

## 2017-05-20 LAB
CULTURE RESULTS: SIGNIFICANT CHANGE UP
CULTURE RESULTS: SIGNIFICANT CHANGE UP
SPECIMEN SOURCE: SIGNIFICANT CHANGE UP
SPECIMEN SOURCE: SIGNIFICANT CHANGE UP

## 2017-05-21 LAB
CULTURE RESULTS: SIGNIFICANT CHANGE UP
SPECIMEN SOURCE: SIGNIFICANT CHANGE UP

## 2017-05-22 DIAGNOSIS — A41.89 OTHER SPECIFIED SEPSIS: ICD-10-CM

## 2017-05-22 DIAGNOSIS — Z95.828 PRESENCE OF OTHER VASCULAR IMPLANTS AND GRAFTS: ICD-10-CM

## 2017-05-22 DIAGNOSIS — J18.9 PNEUMONIA, UNSPECIFIED ORGANISM: ICD-10-CM

## 2017-05-22 DIAGNOSIS — Z79.899 OTHER LONG TERM (CURRENT) DRUG THERAPY: ICD-10-CM

## 2017-05-22 DIAGNOSIS — B97.19 OTHER ENTEROVIRUS AS THE CAUSE OF DISEASES CLASSIFIED ELSEWHERE: ICD-10-CM

## 2017-05-22 DIAGNOSIS — D64.81 ANEMIA DUE TO ANTINEOPLASTIC CHEMOTHERAPY: ICD-10-CM

## 2017-05-22 DIAGNOSIS — K59.00 CONSTIPATION, UNSPECIFIED: ICD-10-CM

## 2017-05-22 DIAGNOSIS — Z87.891 PERSONAL HISTORY OF NICOTINE DEPENDENCE: ICD-10-CM

## 2017-05-22 DIAGNOSIS — I10 ESSENTIAL (PRIMARY) HYPERTENSION: ICD-10-CM

## 2017-05-22 DIAGNOSIS — R65.20 SEVERE SEPSIS WITHOUT SEPTIC SHOCK: ICD-10-CM

## 2017-05-22 DIAGNOSIS — C18.9 MALIGNANT NEOPLASM OF COLON, UNSPECIFIED: ICD-10-CM

## 2017-05-22 DIAGNOSIS — C78.7 SECONDARY MALIGNANT NEOPLASM OF LIVER AND INTRAHEPATIC BILE DUCT: ICD-10-CM

## 2017-05-22 DIAGNOSIS — R50.9 FEVER, UNSPECIFIED: ICD-10-CM

## 2017-05-22 DIAGNOSIS — D70.9 NEUTROPENIA, UNSPECIFIED: ICD-10-CM

## 2017-05-22 DIAGNOSIS — D64.9 ANEMIA, UNSPECIFIED: ICD-10-CM

## 2017-05-22 LAB — CMV DNA CSF QL NAA+PROBE: SIGNIFICANT CHANGE UP

## 2017-05-26 PROBLEM — I10 ESSENTIAL (PRIMARY) HYPERTENSION: Chronic | Status: ACTIVE | Noted: 2017-05-15

## 2017-06-06 ENCOUNTER — OUTPATIENT (OUTPATIENT)
Dept: OUTPATIENT SERVICES | Facility: HOSPITAL | Age: 72
LOS: 1 days | End: 2017-06-06
Payer: MEDICARE

## 2017-06-06 PROCEDURE — 74183 MRI ABD W/O CNTR FLWD CNTR: CPT | Mod: 26

## 2017-06-06 PROCEDURE — 74183 MRI ABD W/O CNTR FLWD CNTR: CPT

## 2017-06-06 PROCEDURE — A9585: CPT

## 2017-06-30 ENCOUNTER — INPATIENT (INPATIENT)
Facility: HOSPITAL | Age: 72
LOS: 1 days | Discharge: HOME | DRG: 312 | End: 2017-07-02
Attending: INTERNAL MEDICINE | Admitting: INTERNAL MEDICINE
Payer: MEDICARE

## 2017-06-30 VITALS
OXYGEN SATURATION: 95 % | HEART RATE: 73 BPM | WEIGHT: 233.69 LBS | TEMPERATURE: 98 F | RESPIRATION RATE: 18 BRPM | SYSTOLIC BLOOD PRESSURE: 126 MMHG | DIASTOLIC BLOOD PRESSURE: 84 MMHG

## 2017-06-30 DIAGNOSIS — R55 SYNCOPE AND COLLAPSE: ICD-10-CM

## 2017-06-30 DIAGNOSIS — I10 ESSENTIAL (PRIMARY) HYPERTENSION: ICD-10-CM

## 2017-06-30 DIAGNOSIS — C18.9 MALIGNANT NEOPLASM OF COLON, UNSPECIFIED: ICD-10-CM

## 2017-06-30 DIAGNOSIS — S01.01XA LACERATION WITHOUT FOREIGN BODY OF SCALP, INITIAL ENCOUNTER: ICD-10-CM

## 2017-06-30 LAB
ALBUMIN SERPL ELPH-MCNC: 3.6 G/DL — SIGNIFICANT CHANGE UP (ref 3.3–5)
ALP SERPL-CCNC: 87 U/L — SIGNIFICANT CHANGE UP (ref 40–120)
ALT FLD-CCNC: 31 U/L — SIGNIFICANT CHANGE UP (ref 10–45)
ANION GAP SERPL CALC-SCNC: 12 MMOL/L — SIGNIFICANT CHANGE UP (ref 5–17)
ANION GAP SERPL CALC-SCNC: 14 MMOL/L — SIGNIFICANT CHANGE UP (ref 5–17)
ANISOCYTOSIS BLD QL: SLIGHT — SIGNIFICANT CHANGE UP
APTT BLD: 33.3 SEC — SIGNIFICANT CHANGE UP (ref 27.5–37.4)
AST SERPL-CCNC: 35 U/L — SIGNIFICANT CHANGE UP (ref 10–40)
BILIRUB SERPL-MCNC: 0.5 MG/DL — SIGNIFICANT CHANGE UP (ref 0.2–1.2)
BUN SERPL-MCNC: 12 MG/DL — SIGNIFICANT CHANGE UP (ref 7–23)
BUN SERPL-MCNC: 15 MG/DL — SIGNIFICANT CHANGE UP (ref 7–23)
CALCIUM SERPL-MCNC: 9.2 MG/DL — SIGNIFICANT CHANGE UP (ref 8.4–10.5)
CALCIUM SERPL-MCNC: 9.7 MG/DL — SIGNIFICANT CHANGE UP (ref 8.4–10.5)
CHLORIDE SERPL-SCNC: 102 MMOL/L — SIGNIFICANT CHANGE UP (ref 96–108)
CHLORIDE SERPL-SCNC: 103 MMOL/L — SIGNIFICANT CHANGE UP (ref 96–108)
CK MB CFR SERPL CALC: 1.1 NG/ML — SIGNIFICANT CHANGE UP (ref 0–6.7)
CK MB CFR SERPL CALC: 1.2 NG/ML — SIGNIFICANT CHANGE UP (ref 0–6.7)
CK SERPL-CCNC: 76 U/L — SIGNIFICANT CHANGE UP (ref 30–200)
CK SERPL-CCNC: 82 U/L — SIGNIFICANT CHANGE UP (ref 30–200)
CO2 SERPL-SCNC: 24 MMOL/L — SIGNIFICANT CHANGE UP (ref 22–31)
CO2 SERPL-SCNC: 24 MMOL/L — SIGNIFICANT CHANGE UP (ref 22–31)
CREAT SERPL-MCNC: 0.6 MG/DL — SIGNIFICANT CHANGE UP (ref 0.5–1.3)
CREAT SERPL-MCNC: 0.7 MG/DL — SIGNIFICANT CHANGE UP (ref 0.5–1.3)
DACRYOCYTES BLD QL SMEAR: SLIGHT — SIGNIFICANT CHANGE UP
EOSINOPHIL NFR BLD AUTO: 3 % — SIGNIFICANT CHANGE UP (ref 0–6)
GLUCOSE SERPL-MCNC: 113 MG/DL — HIGH (ref 70–99)
GLUCOSE SERPL-MCNC: 131 MG/DL — HIGH (ref 70–99)
HCT VFR BLD CALC: 37.7 % — LOW (ref 39–50)
HCV AB S/CO SERPL IA: 0.13 S/CO — SIGNIFICANT CHANGE UP
HCV AB SERPL-IMP: SIGNIFICANT CHANGE UP
HGB BLD-MCNC: 12.1 G/DL — LOW (ref 13–17)
INR BLD: 1.07 — SIGNIFICANT CHANGE UP (ref 0.88–1.16)
LG PLATELETS BLD QL AUTO: PRESENT — SIGNIFICANT CHANGE UP
LYMPHOCYTES # BLD AUTO: 42 % — SIGNIFICANT CHANGE UP (ref 13–44)
MACROCYTES BLD QL: SLIGHT — SIGNIFICANT CHANGE UP
MAGNESIUM SERPL-MCNC: 1.6 MG/DL — SIGNIFICANT CHANGE UP (ref 1.6–2.6)
MANUAL DIF COMMENT BLD-IMP: SIGNIFICANT CHANGE UP
MANUAL SMEAR VERIFICATION: SIGNIFICANT CHANGE UP
MCHC RBC-ENTMCNC: 27.6 PG — SIGNIFICANT CHANGE UP (ref 27–34)
MCHC RBC-ENTMCNC: 32.1 G/DL — SIGNIFICANT CHANGE UP (ref 32–36)
MCV RBC AUTO: 86.1 FL — SIGNIFICANT CHANGE UP (ref 80–100)
MICROCYTES BLD QL: SLIGHT — SIGNIFICANT CHANGE UP
MONOCYTES NFR BLD AUTO: 19 % — HIGH (ref 2–14)
NEUTROPHILS NFR BLD AUTO: 26 % — LOW (ref 43–77)
NEUTS BAND # BLD: 10 % — SIGNIFICANT CHANGE UP
NRBC # BLD: 8 /100 WBCS — HIGH
OVALOCYTES BLD QL SMEAR: SLIGHT — SIGNIFICANT CHANGE UP
PLAT MORPH BLD: (no result)
PLATELET # BLD AUTO: 132 K/UL — LOW (ref 150–400)
PLATELET CLUMP BLD QL SMEAR: PRESENT
POIKILOCYTOSIS BLD QL AUTO: SLIGHT — SIGNIFICANT CHANGE UP
POLYCHROMASIA BLD QL SMEAR: SLIGHT — SIGNIFICANT CHANGE UP
POTASSIUM SERPL-MCNC: 3.8 MMOL/L — SIGNIFICANT CHANGE UP (ref 3.5–5.3)
POTASSIUM SERPL-MCNC: 4.2 MMOL/L — SIGNIFICANT CHANGE UP (ref 3.5–5.3)
POTASSIUM SERPL-SCNC: 3.8 MMOL/L — SIGNIFICANT CHANGE UP (ref 3.5–5.3)
POTASSIUM SERPL-SCNC: 4.2 MMOL/L — SIGNIFICANT CHANGE UP (ref 3.5–5.3)
PROT SERPL-MCNC: 7.5 G/DL — SIGNIFICANT CHANGE UP (ref 6–8.3)
PROTHROM AB SERPL-ACNC: 11.9 SEC — SIGNIFICANT CHANGE UP (ref 9.8–12.7)
RBC # BLD: 4.38 M/UL — SIGNIFICANT CHANGE UP (ref 4.2–5.8)
RBC # FLD: 18.1 % — HIGH (ref 10.3–16.9)
RBC BLD AUTO: (no result)
SODIUM SERPL-SCNC: 139 MMOL/L — SIGNIFICANT CHANGE UP (ref 135–145)
SODIUM SERPL-SCNC: 140 MMOL/L — SIGNIFICANT CHANGE UP (ref 135–145)
SPHEROCYTES BLD QL SMEAR: SLIGHT — SIGNIFICANT CHANGE UP
TROPONIN T SERPL-MCNC: <0.01 NG/ML — SIGNIFICANT CHANGE UP (ref 0–0.01)
TROPONIN T SERPL-MCNC: <0.01 NG/ML — SIGNIFICANT CHANGE UP (ref 0–0.01)
TSH SERPL-MCNC: 0.94 UIU/ML — SIGNIFICANT CHANGE UP (ref 0.35–4.94)
WBC # BLD: 3.5 K/UL — LOW (ref 3.8–10.5)
WBC # FLD AUTO: 3.5 K/UL — LOW (ref 3.8–10.5)

## 2017-06-30 PROCEDURE — 99285 EMERGENCY DEPT VISIT HI MDM: CPT | Mod: 25

## 2017-06-30 PROCEDURE — 12001 RPR S/N/AX/GEN/TRNK 2.5CM/<: CPT

## 2017-06-30 PROCEDURE — 71275 CT ANGIOGRAPHY CHEST: CPT | Mod: 26

## 2017-06-30 PROCEDURE — 93306 TTE W/DOPPLER COMPLETE: CPT | Mod: 26

## 2017-06-30 PROCEDURE — 70450 CT HEAD/BRAIN W/O DYE: CPT | Mod: 26

## 2017-06-30 PROCEDURE — 71010: CPT | Mod: 26

## 2017-06-30 PROCEDURE — 93010 ELECTROCARDIOGRAM REPORT: CPT | Mod: 59

## 2017-06-30 RX ORDER — TETANUS TOXOID, REDUCED DIPHTHERIA TOXOID AND ACELLULAR PERTUSSIS VACCINE, ADSORBED 5; 2.5; 8; 8; 2.5 [IU]/.5ML; [IU]/.5ML; UG/.5ML; UG/.5ML; UG/.5ML
0.5 SUSPENSION INTRAMUSCULAR ONCE
Qty: 0 | Refills: 0 | Status: COMPLETED | OUTPATIENT
Start: 2017-06-30 | End: 2017-06-30

## 2017-06-30 RX ORDER — POTASSIUM CHLORIDE 20 MEQ
20 PACKET (EA) ORAL ONCE
Qty: 0 | Refills: 0 | Status: COMPLETED | OUTPATIENT
Start: 2017-06-30 | End: 2017-06-30

## 2017-06-30 RX ORDER — MAGNESIUM SULFATE 500 MG/ML
2 VIAL (ML) INJECTION ONCE
Qty: 0 | Refills: 0 | Status: COMPLETED | OUTPATIENT
Start: 2017-06-30 | End: 2017-06-30

## 2017-06-30 RX ORDER — AMLODIPINE BESYLATE 2.5 MG/1
0 TABLET ORAL
Qty: 0 | Refills: 0 | COMMUNITY

## 2017-06-30 RX ORDER — SODIUM CHLORIDE 9 MG/ML
1000 INJECTION INTRAMUSCULAR; INTRAVENOUS; SUBCUTANEOUS
Qty: 0 | Refills: 0 | Status: DISCONTINUED | OUTPATIENT
Start: 2017-06-30 | End: 2017-07-01

## 2017-06-30 RX ORDER — CARVEDILOL PHOSPHATE 80 MG/1
0 CAPSULE, EXTENDED RELEASE ORAL
Qty: 0 | Refills: 0 | COMMUNITY

## 2017-06-30 RX ORDER — HEPARIN SODIUM 5000 [USP'U]/ML
5000 INJECTION INTRAVENOUS; SUBCUTANEOUS EVERY 8 HOURS
Qty: 0 | Refills: 0 | Status: DISCONTINUED | OUTPATIENT
Start: 2017-06-30 | End: 2017-07-02

## 2017-06-30 RX ADMIN — Medication 20 MILLIEQUIVALENT(S): at 22:01

## 2017-06-30 RX ADMIN — SODIUM CHLORIDE 125 MILLILITER(S): 9 INJECTION INTRAMUSCULAR; INTRAVENOUS; SUBCUTANEOUS at 13:16

## 2017-06-30 RX ADMIN — HEPARIN SODIUM 5000 UNIT(S): 5000 INJECTION INTRAVENOUS; SUBCUTANEOUS at 21:02

## 2017-06-30 RX ADMIN — Medication 50 GRAM(S): at 22:01

## 2017-06-30 NOTE — ED ADULT NURSE REASSESSMENT NOTE - NS ED NURSE REASSESS COMMENT FT1
Bedside Echo in progress. Pt for admission to [facility]
Evaluation in progress.

## 2017-06-30 NOTE — ED ADULT NURSE NOTE - OBJECTIVE STATEMENT
presented to ED with S/P passed out and hit back of head on cement while watering garden; + LOC; C/O "dizziness past month lasting about 3 seconds each time and worsening.

## 2017-06-30 NOTE — H&P ADULT - ASSESSMENT
Patient is a 71 year old male with past medical history of hypertension and stage 4 colon cancer who had a brief syncopal episode of unknown etiology while watering his garden this morning.

## 2017-06-30 NOTE — H&P ADULT - NSHPPHYSICALEXAM_GEN_ALL_CORE
General:  Well nourished male lying in stretcher in no acute distress  Neuro:  AAOx3, no focal deficits  HEENT:  Patient has 1 cm laceration to back of left side of head  Eyes: EOMI, PERRL  Cardio:  + S1, S2, regular rate and rhythm, no murmurs, rubs or gallops.  Chemoport in right upper chest  Resp: Clear to auscultation bilaterally, no wheezes, rubs or gallops  GI: Abdomen soft, non tender non distended

## 2017-06-30 NOTE — H&P ADULT - NSHPLABSRESULTS_GEN_ALL_CORE
EKG in ED is normal sinus rhythm      LABS                        12.1   3.5   )-----------( 132      ( 30 Jun 2017 12:51 )             37.7     06-30    139  |  103  |  15  ----------------------------<  113<H>  4.2   |  24  |  0.60    Ca    9.7      30 Jun 2017 12:51    TPro  7.5  /  Alb  3.6  /  TBili  0.5  /  DBili  x   /  AST  35  /  ALT  31  /  AlkPhos  87  06-30    LIVER FUNCTIONS - ( 30 Jun 2017 12:51 )  Alb: 3.6 g/dL / Pro: 7.5 g/dL / ALK PHOS: 87 U/L / ALT: 31 U/L / AST: 35 U/L / GGT: x           PT/INR - ( 30 Jun 2017 12:51 )   PT: 11.9 sec;   INR: 1.07          PTT - ( 30 Jun 2017 12:51 )  PTT:33.3 sec    CARDIAC MARKERS ( 30 Jun 2017 12:51 )  creatine kinase 82  ckmb 1.2  Troponin T < 0.01

## 2017-06-30 NOTE — PROGRESS NOTE ADULT - SUBJECTIVE AND OBJECTIVE BOX
Patient is a 71 year old male with PMH of HTN and stage 4 colon cancer with mets to the liver s/p radiation on unknown chemotherapy who presented to the ED after a syncopal episode.  The patient was watering his garden in the front of his house this morning when he passed out and hit his head suffering a laceration on the back of the head.  A bystander noticed the event and went to the back of the house to inform the patient's wife but the patient already regained consciousness when his hose sprayed him with water.  He did not experience any associated symptoms, no dizziness, sweating or nausea prior to the episode.  The patient has never had a syncopal event before but has had bouts of dizziness for the past 1.5 months upon awakening and with sharp movements.  He was in his house when Dr. Velázquez called and told him to go to the ER.    Upon presentation, VSS. Orthostatics WNL;  He was started on NS.        	  MEDICATIONS:              sodium chloride 0.9%. 1000 milliLiter(s) IV Continuous <Continuous>  heparin  Injectable 5000 Unit(s) SubCutaneous every 8 hours      Complaint: Dizzy    Otherwise 12 point review of systems is normal.    PHYSICAL EXAM:    Constitutional: NAD  Eyes: PERRL, EOMI, sclera non-icteric  Neck: supple, no masses, no JVD  Respiratory: CTA b/l, good air entry b/l, no wheezing, rhonchi, rales,   Cardiovascular: RRR, normal S1S2, no M/R/G  Gastrointestinal: soft, NTND, no masses palpable, BS normal in all four quadrants,   Extremities:  no c/c/e  Neurological: AAOx3  Head:Laceration    ICU Vital Signs Last 24 Hrs  T(C): 36.7 (30 Jun 2017 16:13), Max: 36.8 (30 Jun 2017 11:31)  T(F): 98.1 (30 Jun 2017 16:13), Max: 98.2 (30 Jun 2017 11:31)  HR: 72 (30 Jun 2017 18:15) (70 - 76)  BP: 126/71 (30 Jun 2017 18:15) (122/71 - 149/85)  BP(mean): 88 (30 Jun 2017 18:15) (88 - 103)  ABP: --  ABP(mean): --  RR: 16 (30 Jun 2017 18:15) (16 - 18)  SpO2: 96% (30 Jun 2017 18:15) (95% - 98%)          ECG:      CHEST X RAY    CT    MRI    MRA    CT ANGIO    CAROTID DUPLEX    DUPLEX     Echocardiogram  < from: TTE Echo w/Cont Complete (06.30.17 @ 15:29) >  Systolic Pressure: 123 mmHg  Diastolic Pressure: 61 mmHg  BSA: 2.3 m^2  Interpretation Summary  A complete two-dimensional transthoracic echocardiogram was performed (2D,   M-mode, spectral and color flow doppler).  Technically suboptimal.After   verbal   consent obtained, contrast injection of 2ml of diluted Definity contrast   (1.3ml Definity diluted in 8.7ml Saline) were given to enhance   endocardial   resolution.  Normal left ventricular size and wall thickness.Probably   normal   left ventricular wall motion.The left ventricular ejection fraction is   estimated to be 55-60%The left atrial size is normal.Right atrial size is   normal.The right ventricle is normal in size and function.Structurally   normal   aortic valve.No aortic regurgitation noted.Structurally normal mitral   valve.No   mitral regurgitation noted.The tricuspid valve is not well visualized.The   pulmonic valve is not well visualized.No aortic root dilatation.There is   no   pericardial effusion.  Procedure Details  A complete two-dimensional transthoracic echocardiogram was performed (2D,  M-mode, spectral and color flow doppler).  Technically suboptimal.  Study Quality: Poor.  After verbal consent obtained, contrast injection of 2ml of diluted   Definity  contrast (1.3ml Definity diluted in 8.7ml Saline) were given to enhance  endocardial resolution.  Left Ventricle  Normal left ventricular size and wall thickness.  Probably normal left ventricular wall motion.  The left ventricular ejection fraction is normal.  The left ventricular ejection fraction is estimated to be 55-60%  Left Atrium  The left atrial size is normal.  Right Atrium  Right atrial size is normal.  Right Ventricle  The right ventricle is normal in size and function.  Aortic Valve  Structurally normal aortic valve.  No aortic regurgitation noted.  Mitral Valve  Structurally normal mitral valve.      Catheterization:    Stress Test:     LABS:	 	  CARDIAC MARKERS:  Troponin T, Serum: <0.01 ng/mL [0.00 - 0.01] (06-30 @ 12:51)                              12.1   3.5   )-----------( 132      ( 30 Jun 2017 12:51 )             37.7     06-30    139  |  103  |  15  ----------------------------<  113<H>  4.2   |  24  |  0.60    Ca    9.7      30 Jun 2017 12:51    TPro  7.5  /  Alb  3.6  /  TBili  0.5  /  DBili  x   /  AST  35  /  ALT  31  /  AlkPhos  87  06-30          ASSESSMENT/PLAN: 	    Patient is a 71 year old male with past medical history of hypertension and stage 4 colon cancer who had a brief syncopal episode of unknown etiology while watering his garden this morning.    Problem/Plan - 1:  ·  Problem: Syncope.  Plan: Patient on telemetry to monitor for any arrhthymias  Continue to trend cardiac enzymes  EKG showed normal sinus rhythm in the ED, EKG will be repeated in the AM  MRI brain to look for metastasis.     Problem/Plan - 2:  ·  Problem: Laceration of scalp.  Plan: Neurologically intact, s/p laceration repair.     Problem/Plan - 3:  ·  Problem: Colon cancer.  Plan: Patient follows up with Dr. Velázquez.     Problem/Plan - 4:  ·  Problem: Hypertension.  Plan: Hold blood pressure medications for now, restart in AM if he becomes hypertensive.

## 2017-06-30 NOTE — H&P ADULT - NSHPREVIEWOFSYSTEMS_GEN_ALL_CORE
REVIEW OF SYSTEMS      General:	Denies fevers and chills    Skin:  Denies rash and pruritus    Respiratory and Thorax:  Denies shortness of breath and coughing  	  Cardiovascular:  Denies palpitations or chest pain    Gastrointestinal: Denies abdominal pain    Musculoskeletal: Denies any muscle or joint pain    Neurological: Admits to dizziness and syncope

## 2017-06-30 NOTE — ED ADULT TRIAGE NOTE - CHIEF COMPLAINT QUOTE
was gardening and got dizzy and "passed out" and hit back of head ; positive LOC; laceration to right back of head; unknown last tetanus ; has port a cath on right chest wall and found bruise on right abdomen

## 2017-06-30 NOTE — H&P ADULT - NSHPSOCIALHISTORY_GEN_ALL_CORE
Patient denies alcohol and drug use.  Admits to about 15 pack year smoking history but quit 28 years ago.

## 2017-06-30 NOTE — H&P ADULT - HISTORY OF PRESENT ILLNESS
Patient is a 71 year old male with PMH of HTN and stage 4 colon cancer with mets to the liver s/p radiation on unknown chemotherapy who presented to the ED after a syncopal episode.  The patient was watering his garden in the front of his house this morning when he passed out and hit his head suffering a laceration on the back of the head.  A bystander noticed the event and went to the back of the house to inform the patient's wife but the patient already regained consciousness when his hose sprayed him with water.  He did not experience any associated symptoms, no dizziness, sweating or nausea prior to the episode.  The patient has never had a syncopal event before but has had bouts of dizziness for the past 1.5 months upon awakening and with sharp movements.  He was in his house when Dr. Velázquez called and told him to go to the ER.    Upon presentation, VSS. Orthostatics WNL;  He was started on NS.

## 2017-06-30 NOTE — ED PROVIDER NOTE - MEDICAL DECISION MAKING DETAILS
syncope - no ekg changes- laceration scalp  neuro intact - s/p repair janet well  no comps  CT head neg

## 2017-06-30 NOTE — ED PROVIDER NOTE - OBJECTIVE STATEMENT
70 yo male with hx of colon cancer HTN no prior syncope  was gardening when he felt slightly dizzy ended up on the ground lac to right scalp and abrasion to chest wall right sided  near his apt  no antecedant cp or sob  no nausea vomiting  - did have episode of dizziness and vomiting 1 week ago- never saw a md at that time --no prior syncope  no prior stress test or cardiac eval

## 2017-06-30 NOTE — H&P ADULT - PROBLEM SELECTOR PLAN 1
Patient on telemetry to monitor for any arrhthymias  Continue to trend cardiac enzymes  EKG showed normal sinus rhythm in the ED, EKG will be repeated in the AM  MRI brain to look for metastasis

## 2017-06-30 NOTE — H&P ADULT - ATTENDING COMMENTS
seen and examined has had morning orthostasis for the last few months. given his history most likely vaso vagal. ef normal. no events overnight other than some pvc. reduced amlodipine to 5 mg daily. awaiting brain mri. no further cardiac work up needed.

## 2017-07-01 DIAGNOSIS — D61.818 OTHER PANCYTOPENIA: ICD-10-CM

## 2017-07-01 DIAGNOSIS — R45.851 SUICIDAL IDEATIONS: ICD-10-CM

## 2017-07-01 LAB
ANION GAP SERPL CALC-SCNC: 13 MMOL/L — SIGNIFICANT CHANGE UP (ref 5–17)
APTT BLD: 39.5 SEC — HIGH (ref 27.5–37.4)
BUN SERPL-MCNC: 9 MG/DL — SIGNIFICANT CHANGE UP (ref 7–23)
CALCIUM SERPL-MCNC: 9.2 MG/DL — SIGNIFICANT CHANGE UP (ref 8.4–10.5)
CHLORIDE SERPL-SCNC: 104 MMOL/L — SIGNIFICANT CHANGE UP (ref 96–108)
CO2 SERPL-SCNC: 23 MMOL/L — SIGNIFICANT CHANGE UP (ref 22–31)
CREAT SERPL-MCNC: 0.6 MG/DL — SIGNIFICANT CHANGE UP (ref 0.5–1.3)
GLUCOSE SERPL-MCNC: 114 MG/DL — HIGH (ref 70–99)
HCT VFR BLD CALC: 38.4 % — LOW (ref 39–50)
HGB BLD-MCNC: 12.4 G/DL — LOW (ref 13–17)
MAGNESIUM SERPL-MCNC: 2 MG/DL — SIGNIFICANT CHANGE UP (ref 1.6–2.6)
MCHC RBC-ENTMCNC: 27.8 PG — SIGNIFICANT CHANGE UP (ref 27–34)
MCHC RBC-ENTMCNC: 32.3 G/DL — SIGNIFICANT CHANGE UP (ref 32–36)
MCV RBC AUTO: 86.1 FL — SIGNIFICANT CHANGE UP (ref 80–100)
PLATELET # BLD AUTO: 104 K/UL — LOW (ref 150–400)
POTASSIUM SERPL-MCNC: 3.9 MMOL/L — SIGNIFICANT CHANGE UP (ref 3.5–5.3)
POTASSIUM SERPL-SCNC: 3.9 MMOL/L — SIGNIFICANT CHANGE UP (ref 3.5–5.3)
RBC # BLD: 4.46 M/UL — SIGNIFICANT CHANGE UP (ref 4.2–5.8)
RBC # FLD: 18.4 % — HIGH (ref 10.3–16.9)
SODIUM SERPL-SCNC: 140 MMOL/L — SIGNIFICANT CHANGE UP (ref 135–145)
WBC # BLD: 3.1 K/UL — LOW (ref 3.8–10.5)
WBC # FLD AUTO: 3.1 K/UL — LOW (ref 3.8–10.5)

## 2017-07-01 PROCEDURE — 93010 ELECTROCARDIOGRAM REPORT: CPT

## 2017-07-01 PROCEDURE — 93880 EXTRACRANIAL BILAT STUDY: CPT | Mod: 26

## 2017-07-01 RX ORDER — CARVEDILOL PHOSPHATE 80 MG/1
12.5 CAPSULE, EXTENDED RELEASE ORAL EVERY 12 HOURS
Qty: 0 | Refills: 0 | Status: DISCONTINUED | OUTPATIENT
Start: 2017-07-01 | End: 2017-07-02

## 2017-07-01 RX ORDER — AMLODIPINE BESYLATE 2.5 MG/1
5 TABLET ORAL DAILY
Qty: 0 | Refills: 0 | Status: DISCONTINUED | OUTPATIENT
Start: 2017-07-01 | End: 2017-07-02

## 2017-07-01 RX ADMIN — AMLODIPINE BESYLATE 5 MILLIGRAM(S): 2.5 TABLET ORAL at 12:17

## 2017-07-01 RX ADMIN — CARVEDILOL PHOSPHATE 12.5 MILLIGRAM(S): 80 CAPSULE, EXTENDED RELEASE ORAL at 18:08

## 2017-07-01 RX ADMIN — HEPARIN SODIUM 5000 UNIT(S): 5000 INJECTION INTRAVENOUS; SUBCUTANEOUS at 22:05

## 2017-07-01 RX ADMIN — HEPARIN SODIUM 5000 UNIT(S): 5000 INJECTION INTRAVENOUS; SUBCUTANEOUS at 14:39

## 2017-07-01 RX ADMIN — HEPARIN SODIUM 5000 UNIT(S): 5000 INJECTION INTRAVENOUS; SUBCUTANEOUS at 05:08

## 2017-07-01 NOTE — PROGRESS NOTE ADULT - ASSESSMENT
Patient is a 71 year old male with PMH of stage 4 colon cancer with mets to the liver and HTN who presented to the ED after a brief syncopal episode of unknown etiology now endorsing suicidal ideations as well.

## 2017-07-01 NOTE — CHART NOTE - NSCHARTNOTEFT_GEN_A_CORE
RN notified MD that PCA was in opatient room and patient stated he was going to blow his brains out when discharged with a pistol that he has at home; I attended to patient at bedside, and he stated that with everything going on (re; cancer) this is something he;d do at home but said "not to worry" since his pistol is at home and he wouldn't try anything here. Psychiatry consulted, patietnet placed on 1:1 constant observation for SI per psychiatry, and psychiatry will evaluate.

## 2017-07-01 NOTE — PROGRESS NOTE ADULT - PROBLEM SELECTOR PLAN 6
Patient is pancytopenic, likely secondary to chemotherapy-last dose was 6/20/17 according to patient, continue to trend CBC
Patient is pancytopenic, likely secondary to chemotherapy-last dose was 6/20/17 according to patient, continue to trend CBC

## 2017-07-01 NOTE — PROVIDER CONTACT NOTE (OTHER) - BACKGROUND
Admitted to hospital for syncopal episode and fell, laceration to R side back of head noted with 3 staples

## 2017-07-01 NOTE — PROGRESS NOTE ADULT - ATTENDING COMMENTS
Recs:  -Pt without current plan or intent assoc w/ SI, and does not require 1:1 CO at this time.  -If pt found to have mets to brain, further assessment and careful f/u is required, including possible transfer/psychiatric hospitalization  -Pt can benefit from team member carefully discussing test results with him  -No clear need for psychotropic medication initiation at this time  -Will f/u w/ Dr. Ambrosio

## 2017-07-01 NOTE — PROGRESS NOTE ADULT - SUBJECTIVE AND OBJECTIVE BOX
Patient is a 71 year old male with PMH of stage 4 colon cancer with mets to the liver who presented to the ED after a syncopal episode.  The patient passed out while watering his garden, fell from a standing position and hit his head suffering a laceration to the right side of the back of his head.  Prior to the incident, the patient endorsed feeling well and denied any symptoms of dizziness, nausea, or sweating.  He does admit to a 1.5 month history of dizziness upon waking up and with sharp movements.  After the episode, the patient went into his house at which point his physician called and told him to go to the ER.    In the ER, the patient's vital signs were stable and orthostatics were within normal limits.  EKG showed normal sinus rhythm and cardiac enzymes were negative. No evidence of pulmonary embolism on CT angio.  No evidence of intracranial hemorrhage on head CT.    On the floor, the patient had one episode of dizziness while lying down, he felt like the room was spinning.  Second set of cardiac enzymes came back negative. Patient had episodes of PVCs and bigeminy, found to have low magnesium and potassium which was then repleted and the issue resolved.  Additionally the patient endorsed suicidal ideations to the team saying he was going to shoot himself with a pistol when he got home.  Constant observation and a psych consult were ordered at this time.        Patient feels well physically.  This morning, the patient endorses the suicidal ideations he had overnight.  Pt S&E@BS in AM.  D/W Medical Resident  Psych consult ordered  I notified Dr Velázquez who immediately called pts wife    ICU Vital Signs Last 24 Hrs  T(C): 37.3 (01 Jul 2017 17:18), Max: 37.4 (01 Jul 2017 10:14)  T(F): 99.1 (01 Jul 2017 17:18), Max: 99.3 (01 Jul 2017 10:14)  HR: 84 (01 Jul 2017 18:08) (72 - 84)  BP: 147/76 (01 Jul 2017 18:08) (119/76 - 151/86)  BP(mean): 97 (01 Jul 2017 18:08) (90 - 108)  ABP: --  ABP(mean): --  RR: 15 (01 Jul 2017 18:08) (15 - 16)  SpO2: 94% (01 Jul 2017 18:08) (93% - 97%)    I&O's Summary    30 Jun 2017 07:01  -  01 Jul 2017 07:00  --------------------------------------------------------  IN: 1160 mL / OUT: 1895 mL / NET: -735 mL    01 Jul 2017 07:01  -  01 Jul 2017 09:04  --------------------------------------------------------  IN: 320 mL / OUT: 0 mL / NET: 320 mL        CAPILLARY BLOOD GLUCOSE          PHYSICAL EXAM  General: A&Ox 3; NAD  Head: Repaired laceration on the back of the head  Eyes: PERRL; EOMI; anicteric sclera  Neck: Supple; no JVD  Respiratory: CTA B/L; no wheezes/crackles/rales auscultated w/ good air movement  Cardiovascular: Regular rhythm/rate; S1/S2; no gallops or murmurs auscultated, chemoport in right upper chest  Gastrointestinal: Soft; NTND, abdominal fullness throughout  Extremities: No edema or cyanosis; radial/pedal pulses palpable      MEDICATIONS  (STANDING):  heparin  Injectable 5000 Unit(s) SubCutaneous every 8 hours    MEDICATIONS  (PRN):      LABS                        12.4   3.1   )-----------( 104      ( 01 Jul 2017 07:46 )             38.4     07-01    140  |  104  |  9   ----------------------------<  114<H>  3.9   |  23  |  0.60    Ca    9.2      01 Jul 2017 07:46  Mg     2.0     07-01    TPro  7.5  /  Alb  3.6  /  TBili  0.5  /  DBili  x   /  AST  35  /  ALT  31  /  AlkPhos  87  06-30    LIVER FUNCTIONS - ( 30 Jun 2017 12:51 )  Alb: 3.6 g/dL / Pro: 7.5 g/dL / ALK PHOS: 87 U/L / ALT: 31 U/L / AST: 35 U/L / GGT: x           PT/INR - ( 30 Jun 2017 12:51 )   PT: 11.9 sec;   INR: 1.07          PTT - ( 01 Jul 2017 07:46 )  PTT:39.5 sec    CARDIAC MARKERS ( 30 Jun 2017 20:01 )  x     / <0.01 ng/mL / 76 U/L / x     / 1.1 ng/mL  CARDIAC MARKERS ( 30 Jun 2017 12:51 )  x     / <0.01 ng/mL / 82 U/L / x     / 1.2 ng/mL        CT Head  Impression  1.  Focal soft-tissue swelling in the right parietal scalp.     2.  No intracranial hemorrhage, mass or abnormal density in the brain   parenchyma.     CT Angio Chest PE Protocol  IMPRESSION:   1.  No pulmonary embolism.  2.  Pulmonary arterial hypertension.   3.  Mild mediastinal lymphadenopathy, unchanged.   4.  Multiple liver metastases, not significantly changed. Nodular contour   of the liver may be secondary to treated liver metastases versus   cirrhosis.   5.  Splenomegaly.

## 2017-07-01 NOTE — PROGRESS NOTE ADULT - SUBJECTIVE AND OBJECTIVE BOX
S: 70 yo M with PMH stage 4 colon CA w/ mets to liver who presented s/p syncopal episode, found to have PVCs/bigeminy, low Mg and K, now repleted, and no formal PPH. Pt endorsed SI to the team saying he was going to shoot himself with a pistol when he got home.  Constant observation and a psych consult were ordered at the time.  On interview, pt reports dizziness and fear of CA metastasizing to his brain. Although he did express SI, he reports that his thoughts of getting  a gun and shooting himself were confined to the circumstance in which he was found to have metastases to his brain. He reports that his statement was partly made out of anger in context of not finding out test results fast enough. He also expresses concerns about how quickly tests were performed previously and his prior attending's hospital affiliation. In addition to concerns about finding out results, he also expresses concerns about having so many tests, and states he wants to find out what's going on with his disease, not that he is currently suicidal. He appears forward thinking and also states that his grandson is a protective factor for him, shows picture of grandson on his phone.  He denies personal history of psychiatric or mental health contact prior to this hospitalization, and also denies psychiatric illness in his family. He denies current or previous depression or mood elevation, though he does report anger about tests. 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 He has no h/o SIB or SAs, and denies h/o subs abuse outside of nicotine, and states he quit smoking 26 yrs ago. Reports only occasional ETOH and denies other subs. hx. He also denies h/o AH/VH/PI/HI.    O:MSE: elderly hisp M lying in bed in NAD, mildly irritable, especially on initial approach. S: accented, NRRVT, though loud at times, M: "angry" A: congruent, irritable TP: gen. org TC: No PI or delusions elicited, P: denies AH/VH, D: denies HI and reports SI but without intent, and no plan at this time I/J: fair IC: gen. intact

## 2017-07-01 NOTE — PROGRESS NOTE ADULT - SUBJECTIVE AND OBJECTIVE BOX
Hospital Course:  Patient is a 71 year old male with PMH of stage 4 colon cancer with mets to the liver who presented to the ED after a syncopal episode.  The patient passed out while watering his garden, fell from a standing position and hit his head suffering a laceration to the right side of the back of his head.  Prior to the incident, the patient endorsed feeling well and denied any symptoms of dizziness, nausea, or sweating.  He does admit to a 1.5 month history of dizziness upon waking up and with sharp movements.  After the episode, the patient went into his house at which point his physician called and told him to go to the ER.    In the ER, the patient's vital signs were stable and orthostatics were within normal limits.  EKG showed normal sinus rhythm and cardiac enzymes were negative. No evidence of pulmonary embolism on CT angio.  No evidence of intracranial hemorrhage on head CT.    On the floor, the patient had one episode of dizziness while lying down, he felt like the room was spinning.  Second set of cardiac enzymes came back negative. Patient had episodes of PVCs and bigeminy, found to have low magnesium and potassium which was then repleted and the issue resolved.  Additionally the patient endorsed suicidal ideations to the team saying he was going to shoot himself with a pistol when he got home.  Constant observation and a psych consult were ordered at this time.      Subjective:  Patient feels well physically.  This morning, the patient endorses the suicidal ideations he had overnight.    VITALS  Vital Signs Last 24 Hrs  T(C): 36.3 (01 Jul 2017 05:10), Max: 36.9 (30 Jun 2017 21:26)  T(F): 97.4 (01 Jul 2017 05:10), Max: 98.4 (30 Jun 2017 21:26)  HR: 76 (01 Jul 2017 08:33) (70 - 83)  BP: 143/83 (01 Jul 2017 08:33) (122/71 - 151/86)  BP(mean): 108 (01 Jul 2017 08:33) (88 - 108)  RR: 15 (01 Jul 2017 08:33) (15 - 18)  SpO2: 94% (01 Jul 2017 08:33) (94% - 98%)    I&O's Summary    30 Jun 2017 07:01  -  01 Jul 2017 07:00  --------------------------------------------------------  IN: 1160 mL / OUT: 1895 mL / NET: -735 mL    01 Jul 2017 07:01  -  01 Jul 2017 09:04  --------------------------------------------------------  IN: 320 mL / OUT: 0 mL / NET: 320 mL        CAPILLARY BLOOD GLUCOSE          PHYSICAL EXAM  General: A&Ox 3; NAD  Head: Repaired laceration on the back of the head  Eyes: PERRL; EOMI; anicteric sclera  Neck: Supple; no JVD  Respiratory: CTA B/L; no wheezes/crackles/rales auscultated w/ good air movement  Cardiovascular: Regular rhythm/rate; S1/S2; no gallops or murmurs auscultated, chemoport in right upper chest  Gastrointestinal: Soft; NTND, abdominal fullness throughout  Extremities: No edema or cyanosis; radial/pedal pulses palpable  Neurological:  CNII-XII grossly intact; no obvious focal deficits    MEDICATIONS  (STANDING):  heparin  Injectable 5000 Unit(s) SubCutaneous every 8 hours    MEDICATIONS  (PRN):      LABS                        12.4   3.1   )-----------( 104      ( 01 Jul 2017 07:46 )             38.4     07-01    140  |  104  |  9   ----------------------------<  114<H>  3.9   |  23  |  0.60    Ca    9.2      01 Jul 2017 07:46  Mg     2.0     07-01    TPro  7.5  /  Alb  3.6  /  TBili  0.5  /  DBili  x   /  AST  35  /  ALT  31  /  AlkPhos  87  06-30    LIVER FUNCTIONS - ( 30 Jun 2017 12:51 )  Alb: 3.6 g/dL / Pro: 7.5 g/dL / ALK PHOS: 87 U/L / ALT: 31 U/L / AST: 35 U/L / GGT: x           PT/INR - ( 30 Jun 2017 12:51 )   PT: 11.9 sec;   INR: 1.07          PTT - ( 01 Jul 2017 07:46 )  PTT:39.5 sec    CARDIAC MARKERS ( 30 Jun 2017 20:01 )  x     / <0.01 ng/mL / 76 U/L / x     / 1.1 ng/mL  CARDIAC MARKERS ( 30 Jun 2017 12:51 )  x     / <0.01 ng/mL / 82 U/L / x     / 1.2 ng/mL        CT Head  Impression  1.  Focal soft-tissue swelling in the right parietal scalp.     2.  No intracranial hemorrhage, mass or abnormal density in the brain   parenchyma.     CT Angio Chest PE Protocol  IMPRESSION:   1.  No pulmonary embolism.  2.  Pulmonary arterial hypertension.   3.  Mild mediastinal lymphadenopathy, unchanged.   4.  Multiple liver metastases, not significantly changed. Nodular contour   of the liver may be secondary to treated liver metastases versus   cirrhosis.   5.  Splenomegaly.

## 2017-07-01 NOTE — PROGRESS NOTE ADULT - PROBLEM SELECTOR PLAN 1
-Patient currently on monitor, some PVCs with bigemny overnight, found to have low potassium and magnesium which was then repleted and no other events occurred after electrolyte repletion.  Continue to follow electrolytes.  -No hypoglycemia  -Cardiac enzymes negative x2, can stop trending  -EKG in the ED showed normal sinus rhythm, waiting for official read of AM EKG  -No evidence of bleeding on CT head  -No evidence of PE on CT angio  -Carotid duplex ordered  -MRI head ordered
-Patient currently on monitor, some PVCs with bigemny overnight, found to have low potassium and magnesium which was then repleted and no other events occurred after electrolyte repletion.  Continue to follow electrolytes.  -No hypoglycemia  -Cardiac enzymes negative x2, can stop trending  -EKG in the ED showed normal sinus rhythm, waiting for official read of AM EKG  -No evidence of bleeding on CT head  -No evidence of PE on CT angio  -Carotid duplex ordered  -MRI head ordered

## 2017-07-01 NOTE — CHART NOTE - NSCHARTNOTEFT_GEN_A_CORE
Psychiatry consult was called on Mr. Duke at 10 PM on 6/30/17.  The consult was for assessment of suicidal ideation.  Patient was put on 1:1 overnight.  This writer tried to interview patient this morning, 7/1/17 at 10:40 AM.  Patient was lying in bed with 1:1 sitting next to bed.  Patient refused interview from psychiatry.  Patient started to become irritable, saying that he would rather keep everything in than talk to someone from psychiatry.  Patient was told he would  need to be further assessed for suicidality, but continued to refuse at this time.  Spoke with patient's nurse about this and plan to continue 1:1 at this time.

## 2017-07-01 NOTE — PROVIDER CONTACT NOTE (OTHER) - ACTION/TREATMENT ORDERED:
MD Merrill assessed pt, ordered constant 1 to 1 observation. SHAUN Crump made aware. Constant observation 1:1 initiated around 10pm

## 2017-07-01 NOTE — PROGRESS NOTE ADULT - PROBLEM SELECTOR PLAN 4
-Hold antihypertensives for now, restart if patient becomes hypertensive
-Hold antihypertensives for now, restart if patient becomes hypertensive

## 2017-07-02 VITALS
DIASTOLIC BLOOD PRESSURE: 74 MMHG | HEART RATE: 68 BPM | RESPIRATION RATE: 16 BRPM | SYSTOLIC BLOOD PRESSURE: 134 MMHG | OXYGEN SATURATION: 98 %

## 2017-07-02 LAB
ALBUMIN SERPL ELPH-MCNC: 3.1 G/DL — LOW (ref 3.3–5)
ALP SERPL-CCNC: 81 U/L — SIGNIFICANT CHANGE UP (ref 40–120)
ALT FLD-CCNC: 24 U/L — SIGNIFICANT CHANGE UP (ref 10–45)
ANION GAP SERPL CALC-SCNC: 12 MMOL/L — SIGNIFICANT CHANGE UP (ref 5–17)
AST SERPL-CCNC: 26 U/L — SIGNIFICANT CHANGE UP (ref 10–40)
BILIRUB SERPL-MCNC: 0.5 MG/DL — SIGNIFICANT CHANGE UP (ref 0.2–1.2)
BUN SERPL-MCNC: 9 MG/DL — SIGNIFICANT CHANGE UP (ref 7–23)
CALCIUM SERPL-MCNC: 9.1 MG/DL — SIGNIFICANT CHANGE UP (ref 8.4–10.5)
CHLORIDE SERPL-SCNC: 102 MMOL/L — SIGNIFICANT CHANGE UP (ref 96–108)
CO2 SERPL-SCNC: 25 MMOL/L — SIGNIFICANT CHANGE UP (ref 22–31)
CREAT SERPL-MCNC: 0.7 MG/DL — SIGNIFICANT CHANGE UP (ref 0.5–1.3)
GLUCOSE SERPL-MCNC: 102 MG/DL — HIGH (ref 70–99)
HCT VFR BLD CALC: 37.5 % — LOW (ref 39–50)
HGB BLD-MCNC: 12.1 G/DL — LOW (ref 13–17)
MAGNESIUM SERPL-MCNC: 1.9 MG/DL — SIGNIFICANT CHANGE UP (ref 1.6–2.6)
MCHC RBC-ENTMCNC: 28 PG — SIGNIFICANT CHANGE UP (ref 27–34)
MCHC RBC-ENTMCNC: 32.3 G/DL — SIGNIFICANT CHANGE UP (ref 32–36)
MCV RBC AUTO: 86.8 FL — SIGNIFICANT CHANGE UP (ref 80–100)
PLATELET # BLD AUTO: 96 K/UL — LOW (ref 150–400)
POTASSIUM SERPL-MCNC: 3.8 MMOL/L — SIGNIFICANT CHANGE UP (ref 3.5–5.3)
POTASSIUM SERPL-SCNC: 3.8 MMOL/L — SIGNIFICANT CHANGE UP (ref 3.5–5.3)
PROT SERPL-MCNC: 6.9 G/DL — SIGNIFICANT CHANGE UP (ref 6–8.3)
RBC # BLD: 4.32 M/UL — SIGNIFICANT CHANGE UP (ref 4.2–5.8)
RBC # FLD: 18.4 % — HIGH (ref 10.3–16.9)
SODIUM SERPL-SCNC: 139 MMOL/L — SIGNIFICANT CHANGE UP (ref 135–145)
WBC # BLD: 3.1 K/UL — LOW (ref 3.8–10.5)
WBC # FLD AUTO: 3.1 K/UL — LOW (ref 3.8–10.5)

## 2017-07-02 PROCEDURE — 82550 ASSAY OF CK (CPK): CPT

## 2017-07-02 PROCEDURE — 85027 COMPLETE CBC AUTOMATED: CPT

## 2017-07-02 PROCEDURE — 99285 EMERGENCY DEPT VISIT HI MDM: CPT | Mod: 25

## 2017-07-02 PROCEDURE — 71045 X-RAY EXAM CHEST 1 VIEW: CPT

## 2017-07-02 PROCEDURE — 93010 ELECTROCARDIOGRAM REPORT: CPT

## 2017-07-02 PROCEDURE — 85025 COMPLETE CBC W/AUTO DIFF WBC: CPT

## 2017-07-02 PROCEDURE — 70552 MRI BRAIN STEM W/DYE: CPT | Mod: 26

## 2017-07-02 PROCEDURE — 93005 ELECTROCARDIOGRAM TRACING: CPT | Mod: XU

## 2017-07-02 PROCEDURE — 70450 CT HEAD/BRAIN W/O DYE: CPT

## 2017-07-02 PROCEDURE — 85730 THROMBOPLASTIN TIME PARTIAL: CPT

## 2017-07-02 PROCEDURE — 71275 CT ANGIOGRAPHY CHEST: CPT

## 2017-07-02 PROCEDURE — 86803 HEPATITIS C AB TEST: CPT

## 2017-07-02 PROCEDURE — 84443 ASSAY THYROID STIM HORMONE: CPT

## 2017-07-02 PROCEDURE — 80048 BASIC METABOLIC PNL TOTAL CA: CPT

## 2017-07-02 PROCEDURE — 85610 PROTHROMBIN TIME: CPT

## 2017-07-02 PROCEDURE — 12001 RPR S/N/AX/GEN/TRNK 2.5CM/<: CPT

## 2017-07-02 PROCEDURE — C8929: CPT

## 2017-07-02 PROCEDURE — 36415 COLL VENOUS BLD VENIPUNCTURE: CPT

## 2017-07-02 PROCEDURE — A9585: CPT

## 2017-07-02 PROCEDURE — 84484 ASSAY OF TROPONIN QUANT: CPT

## 2017-07-02 PROCEDURE — 83735 ASSAY OF MAGNESIUM: CPT

## 2017-07-02 PROCEDURE — 70552 MRI BRAIN STEM W/DYE: CPT

## 2017-07-02 PROCEDURE — 82553 CREATINE MB FRACTION: CPT

## 2017-07-02 PROCEDURE — 80053 COMPREHEN METABOLIC PANEL: CPT

## 2017-07-02 PROCEDURE — 93880 EXTRACRANIAL BILAT STUDY: CPT

## 2017-07-02 RX ORDER — MAGNESIUM SULFATE 500 MG/ML
2 VIAL (ML) INJECTION ONCE
Qty: 0 | Refills: 0 | Status: COMPLETED | OUTPATIENT
Start: 2017-07-02 | End: 2017-07-02

## 2017-07-02 RX ORDER — POTASSIUM CHLORIDE 20 MEQ
20 PACKET (EA) ORAL
Qty: 0 | Refills: 0 | Status: COMPLETED | OUTPATIENT
Start: 2017-07-02 | End: 2017-07-02

## 2017-07-02 RX ADMIN — HEPARIN SODIUM 5000 UNIT(S): 5000 INJECTION INTRAVENOUS; SUBCUTANEOUS at 13:42

## 2017-07-02 RX ADMIN — CARVEDILOL PHOSPHATE 12.5 MILLIGRAM(S): 80 CAPSULE, EXTENDED RELEASE ORAL at 05:07

## 2017-07-02 RX ADMIN — HEPARIN SODIUM 5000 UNIT(S): 5000 INJECTION INTRAVENOUS; SUBCUTANEOUS at 05:07

## 2017-07-02 RX ADMIN — Medication 20 MILLIEQUIVALENT(S): at 10:19

## 2017-07-02 RX ADMIN — Medication 50 GRAM(S): at 10:14

## 2017-07-02 RX ADMIN — Medication 20 MILLIEQUIVALENT(S): at 12:00

## 2017-07-02 RX ADMIN — AMLODIPINE BESYLATE 5 MILLIGRAM(S): 2.5 TABLET ORAL at 05:07

## 2017-07-02 NOTE — DISCHARGE NOTE ADULT - MEDICATION SUMMARY - MEDICATIONS TO TAKE
I will START or STAY ON the medications listed below when I get home from the hospital:    carvedilol  -- 12.5 milligram(s) by mouth 2 times a day  -- Indication: For Hypertension    amLODIPine  -- 10 milligram(s) by mouth once a day  -- Indication: For Hypertension

## 2017-07-02 NOTE — DISCHARGE NOTE ADULT - HOSPITAL COURSE
Admitted for vertigo episodes over the last month, culminating in an episode of syncope on day of admission while he was watering his garden outside in the heat. Cardiac etiology ruled out. Carotid dopplers negative. Labs WNL. Physical exam without abnormality. Admitted for vertigo episodes over the last month, culminating in an episode of syncope on day of admission while he was watering his garden outside in the heat. Cardiac etiology ruled out. No PE. Carotid dopplers negative. Labs WNL. Physical exam without abnormality. MRI brain performed to r/o mets, which showed Admitted for vertigo episodes over the last month, culminating in an episode of syncope on day of admission while he was watering his garden outside in the heat. Cardiac etiology ruled out. No PE. Carotid dopplers negative. Labs WNL. Physical exam without abnormality. MRI brain performed to r/o mets, which showed no evidence of metastases.

## 2017-07-02 NOTE — DISCHARGE NOTE ADULT - PLAN OF CARE
Outpatient follow-up No immediate cause was found your episode of syncope. You were monitored for two days, without evidence of cardiac arrythmia; CT scan was negative for pulmonary embolism, carotid dopplers were negative for carotid stenosis. You had an MRI of you brain performed, which showed No immediate cause was found your episode of syncope. You were monitored for two days, without evidence of cardiac arrythmia; CT scan was negative for pulmonary embolism, carotid dopplers were negative for carotid stenosis. You had an MRI of you brain performed, which showed no evidence of metastases.

## 2017-07-02 NOTE — PROGRESS NOTE ADULT - ASSESSMENT
Patient is a 71 year old male with PMH of stage 4 colon cancer with mets to the liver who presented to the ED after a syncopal episode  Seems stable for now. Dr. Ambrosio following for suicidal ideation.

## 2017-07-02 NOTE — DISCHARGE NOTE ADULT - CARE PLAN
Principal Discharge DX:	Syncope  Goal:	Outpatient follow-up  Instructions for follow-up, activity and diet:	No immediate cause was found your episode of syncope. You were monitored for two days, without evidence of cardiac arrythmia; CT scan was negative for pulmonary embolism, carotid dopplers were negative for carotid stenosis. You had an MRI of you brain performed, which showed Principal Discharge DX:	Syncope  Goal:	Outpatient follow-up  Instructions for follow-up, activity and diet:	No immediate cause was found your episode of syncope. You were monitored for two days, without evidence of cardiac arrythmia; CT scan was negative for pulmonary embolism, carotid dopplers were negative for carotid stenosis. You had an MRI of you brain performed, which showed no evidence of metastases.

## 2017-07-02 NOTE — PROGRESS NOTE ADULT - SUBJECTIVE AND OBJECTIVE BOX
coverage for Dr. Hendrix  Pt seen and examined   no complaints, no cp, no palpitations. no SOB, no suicidal ideations    REVIEW OF SYSTEMS:  Constitutional: No fever, weight loss or fatigue  Cardiovascular: No chest pain, palpitations, dizziness or leg swelling  Gastrointestinal: No abdominal or epigastric pain. No nausea, vomiting or hematemesis; No diarrhea or constipation. No melena or hematochezia.  Skin: No itching, burning, rashes or lesions       MEDICATIONS:  MEDICATIONS  (STANDING):  heparin  Injectable 5000 Unit(s) SubCutaneous every 8 hours  amLODIPine   Tablet 5 milliGRAM(s) Oral daily  carvedilol 12.5 milliGRAM(s) Oral every 12 hours    MEDICATIONS  (PRN):      Allergies    No Known Allergies    Intolerances        Vital Signs Last 24 Hrs  T(C): 36.9 (02 Jul 2017 12:56), Max: 37.7 (01 Jul 2017 20:42)  T(F): 98.4 (02 Jul 2017 12:56), Max: 99.9 (01 Jul 2017 20:42)  HR: 70 (02 Jul 2017 11:56) (66 - 84)  BP: 120/75 (02 Jul 2017 11:56) (108/63 - 147/76)  BP(mean): 90 (02 Jul 2017 11:56) (75 - 97)  RR: 14 (02 Jul 2017 11:56) (14 - 16)  SpO2: 98% (02 Jul 2017 08:34) (94% - 98%)    07-01 @ 07:01 - 07-02 @ 07:00  --------------------------------------------------------  IN: 1060 mL / OUT: 450 mL / NET: 610 mL    07-02 @ 07:01 - 07-02 @ 13:32  --------------------------------------------------------  IN: 50 mL / OUT: 0 mL / NET: 50 mL        PHYSICAL EXAM:    General: Well developed; well nourished; in no acute distress  HEENT: MMM, conjunctiva and sclera clear  Lungs: clear  Heart: regular  Gastrointestinal: Soft non-tender non-distended; Normal bowel sounds; No hepatosplenomegaly  Skin: Warm and dry. No obvious rash    LABS:      CBC Full  -  ( 02 Jul 2017 06:33 )  WBC Count : 3.1 K/uL  Hemoglobin : 12.1 g/dL  Hematocrit : 37.5 %  Platelet Count - Automated : 96 K/uL  Mean Cell Volume : 86.8 fL  Mean Cell Hemoglobin : 28.0 pg  Mean Cell Hemoglobin Concentration : 32.3 g/dL  Auto Neutrophil # : x  Auto Lymphocyte # : x  Auto Monocyte # : x  Auto Eosinophil # : x  Auto Basophil # : x  Auto Neutrophil % : x  Auto Lymphocyte % : x  Auto Monocyte % : x  Auto Eosinophil % : x  Auto Basophil % : x    07-02    139  |  102  |  9   ----------------------------<  102<H>  3.8   |  25  |  0.70    Ca    9.1      02 Jul 2017 06:33  Mg     1.9     07-02    TPro  6.9  /  Alb  3.1<L>  /  TBili  0.5  /  DBili  x   /  AST  26  /  ALT  24  /  AlkPhos  81  07-02    PTT - ( 01 Jul 2017 07:46 )  PTT:39.5 sec                  RADIOLOGY & ADDITIONAL STUDIES (The following images were personally reviewed):

## 2017-07-02 NOTE — DISCHARGE NOTE ADULT - PATIENT PORTAL LINK FT
“You can access the Follow[employer] Patient Portal, offered by [facility], by registering with the following website: [email or web address]”

## 2017-07-02 NOTE — DISCHARGE NOTE ADULT - CARE PROVIDER_API CALL
Guerline Velázquez (MD), Medicine  [address] [address]  Phone: [phone]  Fax: [phone]

## 2017-07-02 NOTE — DISCHARGE NOTE ADULT - CAREGIVER ADDRESS
[address] [facility], [facility] [id number]

## 2017-07-06 DIAGNOSIS — R16.1 SPLENOMEGALY, NOT ELSEWHERE CLASSIFIED: ICD-10-CM

## 2017-07-06 DIAGNOSIS — I10 ESSENTIAL (PRIMARY) HYPERTENSION: ICD-10-CM

## 2017-07-06 DIAGNOSIS — T45.1X5A ADVERSE EFFECT OF ANTINEOPLASTIC AND IMMUNOSUPPRESSIVE DRUGS, INITIAL ENCOUNTER: ICD-10-CM
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
## 2017-07-06 DIAGNOSIS — I27.2 OTHER SECONDARY PULMONARY HYPERTENSION: ICD-10-CM

## 2017-07-06 DIAGNOSIS — S01.01XA LACERATION WITHOUT FOREIGN BODY OF SCALP, INITIAL ENCOUNTER: ICD-10-CM

## 2017-07-06 DIAGNOSIS — E87.6 HYPOKALEMIA: ICD-10-CM
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
## 2017-07-06 DIAGNOSIS — F43.29 ADJUSTMENT DISORDER WITH OTHER SYMPTOMS: ICD-10-CM

## 2017-07-06 DIAGNOSIS — Z87.891 PERSONAL HISTORY OF NICOTINE DEPENDENCE: ICD-10-CM

## 2017-07-06 DIAGNOSIS — C18.9 MALIGNANT NEOPLASM OF COLON, UNSPECIFIED: ICD-10-CM

## 2017-07-06 DIAGNOSIS — R55 SYNCOPE AND COLLAPSE: ICD-10-CM
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
## 2017-07-06 DIAGNOSIS — D61.810 ANTINEOPLASTIC CHEMOTHERAPY INDUCED PANCYTOPENIA: ICD-10-CM

## 2017-07-06 DIAGNOSIS — C78.7 SECONDARY MALIGNANT NEOPLASM OF LIVER AND INTRAHEPATIC BILE DUCT: ICD-10-CM

## 2017-07-06 DIAGNOSIS — E83.42 HYPOMAGNESEMIA: ICD-10-CM

## 2017-07-31 ENCOUNTER — OUTPATIENT (OUTPATIENT)
Dept: OUTPATIENT SERVICES | Facility: HOSPITAL | Age: 72
LOS: 1 days | End: 2017-07-31
Payer: MEDICARE

## 2017-07-31 PROCEDURE — 78815 PET IMAGE W/CT SKULL-THIGH: CPT

## 2017-07-31 PROCEDURE — A9552: CPT

## 2017-07-31 PROCEDURE — 78815 PET IMAGE W/CT SKULL-THIGH: CPT | Mod: 26

## 2017-10-09 ENCOUNTER — OUTPATIENT (OUTPATIENT)
Dept: OUTPATIENT SERVICES | Facility: HOSPITAL | Age: 72
LOS: 1 days | End: 2017-10-09
Payer: MEDICARE

## 2017-10-09 PROCEDURE — A9585: CPT

## 2017-10-09 PROCEDURE — 74183 MRI ABD W/O CNTR FLWD CNTR: CPT

## 2017-10-09 PROCEDURE — 74183 MRI ABD W/O CNTR FLWD CNTR: CPT | Mod: 26

## 2017-11-22 ENCOUNTER — OUTPATIENT (OUTPATIENT)
Dept: OUTPATIENT SERVICES | Facility: HOSPITAL | Age: 72
LOS: 1 days | End: 2017-11-22
Payer: MEDICARE

## 2017-11-22 PROCEDURE — 74177 CT ABD & PELVIS W/CONTRAST: CPT

## 2017-11-22 PROCEDURE — 74177 CT ABD & PELVIS W/CONTRAST: CPT | Mod: 26

## 2018-01-30 ENCOUNTER — OUTPATIENT (OUTPATIENT)
Dept: OUTPATIENT SERVICES | Facility: HOSPITAL | Age: 73
LOS: 1 days | End: 2018-01-30
Payer: MEDICARE

## 2018-01-30 LAB — GLUCOSE BLDC GLUCOMTR-MCNC: 97 MG/DL — SIGNIFICANT CHANGE UP (ref 70–99)

## 2018-01-30 PROCEDURE — A9552: CPT

## 2018-01-30 PROCEDURE — 78815 PET IMAGE W/CT SKULL-THIGH: CPT | Mod: 26

## 2018-01-30 PROCEDURE — 82962 GLUCOSE BLOOD TEST: CPT

## 2018-01-30 PROCEDURE — 78815 PET IMAGE W/CT SKULL-THIGH: CPT

## 2018-03-18 ENCOUNTER — EMERGENCY (EMERGENCY)
Facility: HOSPITAL | Age: 73
LOS: 1 days | Discharge: HOME | End: 2018-03-18
Attending: EMERGENCY MEDICINE | Admitting: EMERGENCY MEDICINE
Payer: MEDICARE

## 2018-03-18 VITALS
RESPIRATION RATE: 18 BRPM | HEART RATE: 78 BPM | TEMPERATURE: 97 F | SYSTOLIC BLOOD PRESSURE: 178 MMHG | DIASTOLIC BLOOD PRESSURE: 96 MMHG | OXYGEN SATURATION: 96 % | WEIGHT: 255.07 LBS

## 2018-03-18 VITALS
OXYGEN SATURATION: 97 % | HEART RATE: 80 BPM | TEMPERATURE: 98 F | RESPIRATION RATE: 18 BRPM | SYSTOLIC BLOOD PRESSURE: 155 MMHG | DIASTOLIC BLOOD PRESSURE: 88 MMHG

## 2018-03-18 LAB
ALBUMIN SERPL ELPH-MCNC: 3.3 G/DL — SIGNIFICANT CHANGE UP (ref 3.3–5)
ALP SERPL-CCNC: 95 U/L — SIGNIFICANT CHANGE UP (ref 40–120)
ALT FLD-CCNC: 34 U/L — SIGNIFICANT CHANGE UP (ref 10–45)
ANION GAP SERPL CALC-SCNC: 12 MMOL/L — SIGNIFICANT CHANGE UP (ref 5–17)
APPEARANCE UR: CLEAR — SIGNIFICANT CHANGE UP
APTT BLD: 36.1 SEC — SIGNIFICANT CHANGE UP (ref 27.5–37.4)
AST SERPL-CCNC: 38 U/L — SIGNIFICANT CHANGE UP (ref 10–40)
BACTERIA # UR AUTO: PRESENT /HPF
BASOPHILS NFR BLD AUTO: 0.5 % — SIGNIFICANT CHANGE UP (ref 0–2)
BILIRUB SERPL-MCNC: 1.4 MG/DL — HIGH (ref 0.2–1.2)
BILIRUB UR-MCNC: NEGATIVE — SIGNIFICANT CHANGE UP
BUN SERPL-MCNC: 14 MG/DL — SIGNIFICANT CHANGE UP (ref 7–23)
CALCIUM SERPL-MCNC: 9.2 MG/DL — SIGNIFICANT CHANGE UP (ref 8.4–10.5)
CHLORIDE SERPL-SCNC: 100 MMOL/L — SIGNIFICANT CHANGE UP (ref 96–108)
CO2 SERPL-SCNC: 24 MMOL/L — SIGNIFICANT CHANGE UP (ref 22–31)
COLOR SPEC: YELLOW — SIGNIFICANT CHANGE UP
CREAT SERPL-MCNC: 0.68 MG/DL — SIGNIFICANT CHANGE UP (ref 0.5–1.3)
DIFF PNL FLD: (no result)
EOSINOPHIL NFR BLD AUTO: 2.4 % — SIGNIFICANT CHANGE UP (ref 0–6)
EPI CELLS # UR: SIGNIFICANT CHANGE UP /HPF (ref 0–5)
GLUCOSE SERPL-MCNC: 136 MG/DL — HIGH (ref 70–99)
GLUCOSE UR QL: NEGATIVE — SIGNIFICANT CHANGE UP
HCT VFR BLD CALC: 45.7 % — SIGNIFICANT CHANGE UP (ref 39–50)
HGB BLD-MCNC: 15.3 G/DL — SIGNIFICANT CHANGE UP (ref 13–17)
HYALINE CASTS # UR AUTO: SIGNIFICANT CHANGE UP /LPF (ref 0–2)
INR BLD: 1.13 — SIGNIFICANT CHANGE UP (ref 0.88–1.16)
KETONES UR-MCNC: NEGATIVE — SIGNIFICANT CHANGE UP
LEUKOCYTE ESTERASE UR-ACNC: NEGATIVE — SIGNIFICANT CHANGE UP
LIDOCAIN IGE QN: 16 U/L — SIGNIFICANT CHANGE UP (ref 7–60)
LYMPHOCYTES # BLD AUTO: 25.3 % — SIGNIFICANT CHANGE UP (ref 13–44)
MCHC RBC-ENTMCNC: 28.3 PG — SIGNIFICANT CHANGE UP (ref 27–34)
MCHC RBC-ENTMCNC: 33.5 G/DL — SIGNIFICANT CHANGE UP (ref 32–36)
MCV RBC AUTO: 84.6 FL — SIGNIFICANT CHANGE UP (ref 80–100)
MONOCYTES NFR BLD AUTO: 13.7 % — SIGNIFICANT CHANGE UP (ref 2–14)
NEUTROPHILS NFR BLD AUTO: 58.1 % — SIGNIFICANT CHANGE UP (ref 43–77)
NITRITE UR-MCNC: NEGATIVE — SIGNIFICANT CHANGE UP
PH UR: 6 — SIGNIFICANT CHANGE UP (ref 5–8)
PLATELET # BLD AUTO: 127 K/UL — LOW (ref 150–400)
POTASSIUM SERPL-MCNC: 4.1 MMOL/L — SIGNIFICANT CHANGE UP (ref 3.5–5.3)
POTASSIUM SERPL-SCNC: 4.1 MMOL/L — SIGNIFICANT CHANGE UP (ref 3.5–5.3)
PROT SERPL-MCNC: 8.2 G/DL — SIGNIFICANT CHANGE UP (ref 6–8.3)
PROT UR-MCNC: 100 MG/DL
PROTHROM AB SERPL-ACNC: 12.6 SEC — SIGNIFICANT CHANGE UP (ref 9.8–12.7)
RBC # BLD: 5.4 M/UL — SIGNIFICANT CHANGE UP (ref 4.2–5.8)
RBC # FLD: 16.8 % — SIGNIFICANT CHANGE UP (ref 10.3–16.9)
RBC CASTS # UR COMP ASSIST: < 5 /HPF — SIGNIFICANT CHANGE UP
SODIUM SERPL-SCNC: 136 MMOL/L — SIGNIFICANT CHANGE UP (ref 135–145)
SP GR SPEC: 1.02 — SIGNIFICANT CHANGE UP (ref 1–1.03)
UROBILINOGEN FLD QL: 0.2 E.U./DL — SIGNIFICANT CHANGE UP
WBC # BLD: 6.3 K/UL — SIGNIFICANT CHANGE UP (ref 3.8–10.5)
WBC # FLD AUTO: 6.3 K/UL — SIGNIFICANT CHANGE UP (ref 3.8–10.5)
WBC UR QL: < 5 /HPF — SIGNIFICANT CHANGE UP

## 2018-03-18 PROCEDURE — 99284 EMERGENCY DEPT VISIT MOD MDM: CPT | Mod: 25

## 2018-03-18 PROCEDURE — 74177 CT ABD & PELVIS W/CONTRAST: CPT | Mod: 26

## 2018-03-18 PROCEDURE — 93010 ELECTROCARDIOGRAM REPORT: CPT

## 2018-03-18 RX ORDER — IOHEXOL 300 MG/ML
50 INJECTION, SOLUTION INTRAVENOUS ONCE
Qty: 0 | Refills: 0 | Status: COMPLETED | OUTPATIENT
Start: 2018-03-18 | End: 2018-03-18

## 2018-03-18 RX ORDER — SODIUM CHLORIDE 9 MG/ML
1000 INJECTION INTRAMUSCULAR; INTRAVENOUS; SUBCUTANEOUS ONCE
Qty: 0 | Refills: 0 | Status: COMPLETED | OUTPATIENT
Start: 2018-03-18 | End: 2018-03-18

## 2018-03-18 RX ORDER — CARVEDILOL PHOSPHATE 80 MG/1
12.5 CAPSULE, EXTENDED RELEASE ORAL
Qty: 0 | Refills: 0 | COMMUNITY

## 2018-03-18 RX ORDER — ONDANSETRON 8 MG/1
4 TABLET, FILM COATED ORAL ONCE
Qty: 0 | Refills: 0 | Status: COMPLETED | OUTPATIENT
Start: 2018-03-18 | End: 2018-03-18

## 2018-03-18 RX ORDER — AMLODIPINE BESYLATE 2.5 MG/1
10 TABLET ORAL
Qty: 0 | Refills: 0 | COMMUNITY

## 2018-03-18 RX ORDER — MORPHINE SULFATE 50 MG/1
4 CAPSULE, EXTENDED RELEASE ORAL ONCE
Qty: 0 | Refills: 0 | Status: DISCONTINUED | OUTPATIENT
Start: 2018-03-18 | End: 2018-03-18

## 2018-03-18 RX ORDER — KETOROLAC TROMETHAMINE 30 MG/ML
15 SYRINGE (ML) INJECTION ONCE
Qty: 0 | Refills: 0 | Status: DISCONTINUED | OUTPATIENT
Start: 2018-03-18 | End: 2018-03-18

## 2018-03-18 RX ADMIN — Medication 15 MILLIGRAM(S): at 20:26

## 2018-03-18 RX ADMIN — Medication 15 MILLIGRAM(S): at 19:54

## 2018-03-18 RX ADMIN — IOHEXOL 50 MILLILITER(S): 300 INJECTION, SOLUTION INTRAVENOUS at 18:48

## 2018-03-18 RX ADMIN — MORPHINE SULFATE 4 MILLIGRAM(S): 50 CAPSULE, EXTENDED RELEASE ORAL at 20:26

## 2018-03-18 RX ADMIN — ONDANSETRON 4 MILLIGRAM(S): 8 TABLET, FILM COATED ORAL at 19:54

## 2018-03-18 RX ADMIN — MORPHINE SULFATE 4 MILLIGRAM(S): 50 CAPSULE, EXTENDED RELEASE ORAL at 18:48

## 2018-03-18 RX ADMIN — SODIUM CHLORIDE 1000 MILLILITER(S): 9 INJECTION INTRAMUSCULAR; INTRAVENOUS; SUBCUTANEOUS at 18:48

## 2018-03-18 NOTE — ED ADULT TRIAGE NOTE - CHIEF COMPLAINT QUOTE
right flank pain started 2-3 days ago - wraps around to abdomen ; denies NVD or constipation   or urinary symptoms HX ; colon CA

## 2018-03-18 NOTE — ED PROVIDER NOTE - MEDICAL DECISION MAKING DETAILS
intermittent right flank/abd pain.  concern for renal stone, cancer, biliary patholgy.  labs unremarkable.  ct done and similar to prior with pancreatic inflammation and small ascites.  lipase wnl and no epigastric pain making acute pancreatitis unlikely.  suspect pain related to known liver mets.  resolved with toradol.  will continue prn motrin at home.  has f/u with oncology and pmd already scheduled in next few days.

## 2018-03-20 ENCOUNTER — APPOINTMENT (OUTPATIENT)
Dept: MRI IMAGING | Facility: HOSPITAL | Age: 73
End: 2018-03-20
Payer: MEDICARE

## 2018-03-20 ENCOUNTER — OUTPATIENT (OUTPATIENT)
Dept: OUTPATIENT SERVICES | Facility: HOSPITAL | Age: 73
LOS: 1 days | End: 2018-03-20
Payer: MEDICARE

## 2018-03-20 LAB
CULTURE RESULTS: NO GROWTH — SIGNIFICANT CHANGE UP
SPECIMEN SOURCE: SIGNIFICANT CHANGE UP

## 2018-03-20 PROCEDURE — 72197 MRI PELVIS W/O & W/DYE: CPT | Mod: 26

## 2018-03-20 PROCEDURE — 74183 MRI ABD W/O CNTR FLWD CNTR: CPT | Mod: 26

## 2018-03-20 PROCEDURE — 74183 MRI ABD W/O CNTR FLWD CNTR: CPT

## 2018-03-20 PROCEDURE — 72197 MRI PELVIS W/O & W/DYE: CPT

## 2018-03-20 PROCEDURE — A9577: CPT

## 2018-03-22 DIAGNOSIS — R14.0 ABDOMINAL DISTENSION (GASEOUS): ICD-10-CM

## 2018-03-22 DIAGNOSIS — R10.9 UNSPECIFIED ABDOMINAL PAIN: ICD-10-CM

## 2018-03-22 DIAGNOSIS — Z79.899 OTHER LONG TERM (CURRENT) DRUG THERAPY: ICD-10-CM

## 2018-03-22 DIAGNOSIS — I10 ESSENTIAL (PRIMARY) HYPERTENSION: ICD-10-CM

## 2018-03-31 PROCEDURE — 80053 COMPREHEN METABOLIC PANEL: CPT

## 2018-03-31 PROCEDURE — 36415 COLL VENOUS BLD VENIPUNCTURE: CPT

## 2018-03-31 PROCEDURE — 99284 EMERGENCY DEPT VISIT MOD MDM: CPT | Mod: 25

## 2018-03-31 PROCEDURE — 83690 ASSAY OF LIPASE: CPT

## 2018-03-31 PROCEDURE — 81001 URINALYSIS AUTO W/SCOPE: CPT

## 2018-03-31 PROCEDURE — 85730 THROMBOPLASTIN TIME PARTIAL: CPT

## 2018-03-31 PROCEDURE — 74177 CT ABD & PELVIS W/CONTRAST: CPT

## 2018-03-31 PROCEDURE — 85025 COMPLETE CBC W/AUTO DIFF WBC: CPT

## 2018-03-31 PROCEDURE — 87086 URINE CULTURE/COLONY COUNT: CPT

## 2018-03-31 PROCEDURE — 85610 PROTHROMBIN TIME: CPT

## 2018-03-31 PROCEDURE — 96375 TX/PRO/DX INJ NEW DRUG ADDON: CPT

## 2018-03-31 PROCEDURE — 93005 ELECTROCARDIOGRAM TRACING: CPT

## 2018-03-31 PROCEDURE — 96374 THER/PROPH/DIAG INJ IV PUSH: CPT

## 2018-07-23 NOTE — DISCHARGE NOTE ADULT - NS AS DC AMI YN
Detail Level: Detailed Include Z78.9 (Other Specified Conditions Influencing Health Status) As An Associated Diagnosis?: No Total Volume Injected (Ccs- Only Use Numbers And Decimals): .5 Concentration Of Solution Injected (Mg/Ml): 6.5 Consent: The risks of atrophy were reviewed with the patient. Administered By (Optional): Carlos Moses pa-c Kenalog Preparation: Kenalog in bacteriostatic water Medical Necessity Clause: This procedure was medically necessary because the lesions that were treated were: X Size Of Lesion In Cm (Optional): 0 no

## 2020-09-14 NOTE — ED ADULT NURSE NOTE - NS ED NURSE RECORD ANOTHER HT AND WT
1. Buy compression stockings from the pharmacy for leg swelling  2. Quit smoking with Chantix and nicotine gum.  3. Spireva inakathieer once daily for COPD.     I will look forward to seeing in back in 3 months.    Yes

## 2021-08-10 NOTE — PROGRESS NOTE ADULT - PROVIDER SPECIALTY LIST ADULT
Internal Medicine Peep increased to 12 per Dr. Hilario MD respiratory orders. Will continue to monitor.

## 2022-07-04 NOTE — ED PROVIDER NOTE - CADM POA CENTRAL LINE
Pre Dialysis Assessment: Awake, alert and oriented, verbally expressing needs.     Pre Weight and Post Weight: 136.9kg/133.9kg     Location/Access/Attempts: Right SC HD double lumen catheter     Dressing status/changed: WNL/ last changed 6/27/22    Ultra-filtration Goal/ Actual:  2-3L/3L     Treatment Length: 3.5hrs     Dialysate (K+/Ca) Indicate if changed during treatment: 4k/2.5Ca     Summary of Treatment:      > Pt tolerated treatment w/o complication.     > Afebrile, VSS, denies pain/SOB     > Net UF removed 3L.     > Report given to primary RN.       No

## 2022-12-29 NOTE — DIETITIAN INITIAL EVALUATION ADULT. - PERTINENT LABORATORY DATA
Low H/H,K:3.2,Albumin:2.4 Bactrim Pregnancy And Lactation Text: This medication is Pregnancy Category D and is known to cause fetal risk.  It is also excreted in breast milk.

## 2023-02-01 NOTE — ED PROVIDER NOTE - OBJECTIVE STATEMENT
history of colon cancer with mets to liver, s/p radiation and undergoing chemo (every 2 weeks next dose in 2 days) here with right sided flank pain radiating around to abdomen.  Associated with decreased appetite.  No fever/chills, vomiting, diarrhea.  Tried tylenol and his wife's oxycodone today without relief. hgb 7.5 in ED. give 1unit prbc. will dc

## 2024-01-03 NOTE — DISCHARGE NOTE ADULT - NSTOBACCONEVERSMOKERY/N_GEN_A
Was diagnosed with the flu on Saturday now complaining of right ear pain. Fevers x 3 days. Going to [employer] this weekend.    Yes, Non-Core measure site...
